# Patient Record
Sex: MALE | Race: WHITE | NOT HISPANIC OR LATINO | Employment: OTHER | URBAN - METROPOLITAN AREA
[De-identification: names, ages, dates, MRNs, and addresses within clinical notes are randomized per-mention and may not be internally consistent; named-entity substitution may affect disease eponyms.]

---

## 2017-01-03 ENCOUNTER — PATIENT MESSAGE (OUTPATIENT)
Dept: UROLOGY | Facility: CLINIC | Age: 77
End: 2017-01-03

## 2017-01-03 ENCOUNTER — OFFICE VISIT (OUTPATIENT)
Dept: UROLOGY | Facility: CLINIC | Age: 77
End: 2017-01-03
Payer: MEDICARE

## 2017-01-03 VITALS
BODY MASS INDEX: 31.55 KG/M2 | DIASTOLIC BLOOD PRESSURE: 91 MMHG | SYSTOLIC BLOOD PRESSURE: 151 MMHG | HEIGHT: 69 IN | HEART RATE: 68 BPM | WEIGHT: 213 LBS

## 2017-01-03 DIAGNOSIS — N31.9 NEUROGENIC BLADDER: Primary | ICD-10-CM

## 2017-01-03 DIAGNOSIS — Z85.46 H/O PROSTATE CANCER: ICD-10-CM

## 2017-01-03 DIAGNOSIS — Z92.3 HISTORY OF BRACHYTHERAPY: ICD-10-CM

## 2017-01-03 LAB — POC RESIDUAL URINE VOLUME: NORMAL ML (ref 0–100)

## 2017-01-03 PROCEDURE — 51798 US URINE CAPACITY MEASURE: CPT | Mod: S$GLB,,, | Performed by: NURSE PRACTITIONER

## 2017-01-03 PROCEDURE — 99211 OFF/OP EST MAY X REQ PHY/QHP: CPT | Mod: 25,S$GLB,, | Performed by: NURSE PRACTITIONER

## 2017-01-03 PROCEDURE — 99999 PR PBB SHADOW E&M-EST. PATIENT-LVL V: CPT | Mod: PBBFAC,,, | Performed by: NURSE PRACTITIONER

## 2017-01-03 NOTE — PATIENT INSTRUCTIONS
Urinary Retention (Male)  Urinary retention is the medical term for difficulty or inability to pass urine, even though your bladder is full.  Causes  The most common cause of urinary retention in men is the bladder outlet being blocked. This can be due to an enlarged prostate gland or a bladder infection. Certain medicines can also cause this problem. This condition is more likely to occur as men get older.    This condition is treated by insertion of a catheter into the bladder to drain the urine. This provides immediate relief. The catheter may need to remain in place for a few days to prevent a recurrence. The catheter has a balloon on the tip which was inflated after insertion. This prevents the catheter from falling out.  Symptoms  Common symptoms of urinary retention include:  · Pain (not experienced by everyone)  · Frequent urination  · Feeling that the bladder is still full after urinating  · Incontinence (not being able to control the release of urine)  · Swollen abdomen  Treatment  This condition is treated by inserting a tube (catheter) into the bladder to drain the urine. This provides immediate relief. The catheter may need to stay in place for a few days. The catheter has a balloon on the tip, which is inflated after insertion. This prevents the catheter from falling out.  Home care  · If you were given antibiotics, take them until they are used up, or your healthcare provider tells you to stop. It is important to finish the antibiotics even though you feel better. This is to make sure your infection has cleared.  · If a catheter was left in place, it is important to keep bacteria from getting into the collection bag. Do not disconnect the catheter from the collection bag.  · Use a leg band to secure the drainage tube, so it does not pull on the catheter. Drain the collection bag when it becomes full using the drain spout at the bottom of the bag.  · Do not pull on or try to remove your catheter.  This will injure your urethra. The catheter must be removed by a healthcare provider.  Follow-up care  Follow up with your healthcare provider, or as advised.  If a catheter was left in place, it can usually be removed within 3 to 7 days. Some conditions require the catheter to stay in longer. Your healthcare provider will tell you when to return to have the catheter removed.  When to seek medical advice  Call your healthcare provider right away if any of these occur:  · Fever of 101.4ºF (38ºC) or higher, or as directed by your healthcare provider  · Bladder or lower-abdominal pain or fullness  · Abdominal swelling, nausea, vomiting, or back pain  · Blood or urine leakage around the catheter  · Bloody urine coming from the catheter (if a new symptom)  · Weakness, dizziness, or fainting  · Confusion or change in usual level of alertness  · If a catheter was left in place, return if:  ¨ Catheter falls out  ¨ Catheter stops draining for 6 hours  © 8271-6165 [employer] [address] [place] [address], [place] [address]. All rights reserved. This information is not intended as a substitute for professional medical care. Always follow your healthcare professional's instructions.

## 2017-01-03 NOTE — PROGRESS NOTES
CHIEF COMPLAINT:    Mr. Elizondo is a 76 y.o. male presenting for PVR.    PRESENTING ILLNESS:    Raymond Elizondo is a 76 y.o. male with a PMH of prostate cancer who presents for PVR.  He was last seen with Dr. Hemphill on 11/4/16 for a post op visit.     s/p cysto and urethral dilation for meatal stenosis, membranous urethral stricture, and CO2 laser and excisional bx of multiple penile lesions on 11/2/16 with Dr. Hemphill. On 11/4/16, leyva catheter was pulled but pt returned to the ED for urinary retention. Leyva reinserted at ED.   Prior to procedures, he had c/p hesitancy, difficult voiding, dribbling.     s/p brachytherapy combined with external beam radiation therapy for prostate cancer at  10 years ago.  Hx of urinary retention with blood clots.     Today, he is here for PVR following VT.   He reports a good FOS and complete bladder emptying.   He has no urinary complaints.   He denies dysuria, flank pain, and f/c.   Currently taking flomax daily.    REVIEW OF SYSTEMS:    Review of Systems    Constitutional: Negative for fever and chills.   HENT: Negative for hearing loss.   Eyes: Negative for visual disturbance.   Respiratory: Negative for shortness of breath.   Cardiovascular: Negative for chest pain.   Gastrointestinal: Negative for nausea, vomiting, and constipation.   Genitourinary:  See above  Neurological: Negative for dizziness.   Hematological: Does not bruise/bleed easily.   Psychiatric/Behavioral: Negative for confusion.       PATIENT HISTORY:    Past Medical History   Diagnosis Date    Cirrhosis     COPD (chronic obstructive pulmonary disease)     Coronary artery disease     Diabetes mellitus     Esophageal cancer     Hypertension     Irregular heart beat     Pancreatitis      3x    Prostate cancer     Skin cancer      multiple sites    Urinary tract infection        Past Surgical History   Procedure Laterality Date    Skin cancer excision       multiple    Appendectomy       Cholecystectomy      Esophageal reconstruction      Elbow surgery       Right    Knee surgery       right    Tonsillectomy      Skin cancer excision       Left ear, Left flank, Right flank       Family History   Problem Relation Age of Onset    Diabetes Mother     Cancer Mother     Cancer Father     Prostate cancer Neg Hx     Kidney disease Neg Hx        Social History     Social History    Marital status:      Spouse name: N/A    Number of children: N/A    Years of education: N/A     Occupational History    Not on file.     Social History Main Topics    Smoking status: Current Some Day Smoker     Packs/day: 0.25     Years: 0.50    Smokeless tobacco: Not on file    Alcohol use No    Drug use: No    Sexual activity: Not Currently     Other Topics Concern    Not on file     Social History Narrative       Allergies:  Aspirin    Medications:    Current Outpatient Prescriptions:     albuterol (PROVENTIL) 5 mg/mL nebulizer solution, Take 2.5 mg by nebulization every 6 (six) hours as needed., Disp: , Rfl:     alprazolam (XANAX) 0.25 MG tablet, Take 0.25 mg by mouth 3 (three) times daily., Disp: , Rfl:     amlodipine (NORVASC) 10 MG tablet, Take 10 mg by mouth once daily., Disp: , Rfl:     atenolol (TENORMIN) 25 MG tablet, Take 25 mg by mouth once daily., Disp: , Rfl:     betamethasone dipropionate (DIPROLENE) 0.05 % ointment, Apply topically 2 (two) times daily. To start at 1 week post-surgery., Disp: 45 g, Rfl: 0    BREO ELLIPTA 100-25 mcg/dose diskus inhaler, , Disp: , Rfl:     bumetanide (BUMEX) 2 MG tablet, Take 2 mg by mouth 2 (two) times daily., Disp: , Rfl:     clotrimazole-betamethasone (LOTRISONE) lotion, APPLY 1 APPLICATION TOPICALLY TO AFFECTED AREA 2 TIMES PER DAY, Disp: , Rfl: 2    diphenoxylate-atropine 2.5-0.025 mg (LOMOTIL) 2.5-0.025 mg per tablet, Take 2 tablets by mouth every 6 (six) hours as needed., Disp: , Rfl: 5    fenofibrate 160 MG Tab, Take 160 mg by mouth once  daily., Disp: , Rfl:     ferrous sulfate 325 mg (65 mg iron) Tab tablet, Take 325 mg by mouth once daily., Disp: , Rfl:     glipiZIDE (GLUCOTROL) 5 MG tablet, Take 5 mg by mouth daily with breakfast. , Disp: , Rfl:     ipratropium (ATROVENT HFA) 17 mcg/actuation inhaler, Inhale 2 puffs into the lungs every 6 (six) hours., Disp: , Rfl:     lorazepam (ATIVAN) 1 MG tablet, Take 1 mg by mouth every 6 (six) hours as needed for Anxiety., Disp: , Rfl:     nitroGLYCERIN (NITROSTAT) 0.4 MG SL tablet, Place 0.4 mg under the tongue every 5 (five) minutes as needed for Chest pain., Disp: , Rfl:     omeprazole (PRILOSEC) 20 MG capsule, Take 20 mg by mouth once daily., Disp: , Rfl:     polyethylene glycol (GLYCOLAX) 17 gram PwPk, Take 17 g by mouth daily as needed., Disp: 30 packet, Rfl: 0    potassium chloride (MICRO-K) 10 MEQ CpSR, Take 10 mEq by mouth once daily., Disp: , Rfl:     pravastatin (PRAVACHOL) 40 MG tablet, Take 40 mg by mouth once daily., Disp: , Rfl:     senna (SENOKOT) 8.6 mg tablet, Take 1 tablet by mouth once daily., Disp: , Rfl:     tamsulosin (FLOMAX) 0.4 mg Cp24, Take 1 capsule (0.4 mg total) by mouth every evening., Disp: 90 capsule, Rfl: 3    tramadol (ULTRAM) 50 mg tablet, Take 50 mg by mouth every 6 (six) hours as needed for Pain., Disp: , Rfl:     VENTOLIN HFA 90 mcg/actuation inhaler, , Disp: , Rfl:     PHYSICAL EXAMINATION:    Constitutional: He is oriented to person, place, and time. He appears well-developed and well-nourished.  He is in no apparent distress.    Neck: No tracheal deviation present.     Cardiovascular: Normal rate.      Pulmonary/Chest: Effort normal. No respiratory distress.     Abdominal:  He exhibits no distension.     Neurological: He is alert and oriented to person, place, and time.     Skin: Skin is warm and dry.     Psych: Cooperative with normal affect.      Physical Exam      LABS:    Lab Results   Component Value Date    PSADIAG <0.01 06/27/2016        IMPRESSION:    Encounter Diagnoses   Name Primary?    Neurogenic bladder Yes    H/O prostate cancer     History of brachytherapy        PLAN:  Pt urinated 500 cc with no difficulties. PVR with bladder scan was 278 cc. Pt voided again then PVR 96 cc.  Continue flomax  F/u with Dr. Hemphill in 3/2017 per his notes.  Patient voiced understanding  I encouraged him or any of his family members to call or email me with questions and/or concerns.    GLENDY Ashley

## 2017-01-03 NOTE — MR AVS SNAPSHOT
[facility] - Urology 4th Floor  [address]  [place] [address]  Phone: [phone]                  Raymond Elizondo   1/3/2017 10:40 AM   Office Visit    Description:  Male : 1940   Provider:  Ibeth Ponce NP   Department:  [facility] 4th Floor           Reason for Visit     Other           Diagnoses this Visit        Comments    Neurogenic bladder    -  Primary     H/O prostate cancer         History of brachytherapy                To Do List           Goals (5 Years of Data)     None      [facility] On [facility]     [facility] On Call Nurse Care Line -  Assistance  Registered nurses in the [facility] On Call Center provide clinical advisement, health education, appointment booking, and other advisory services.  Call for this free service at [phone].             Medications                Verify that the below list of medications is an accurate representation of the medications you are currently taking.  If none reported, the list may be blank. If incorrect, please contact your healthcare provider. Carry this list with you in case of emergency.           Current Medications     albuterol (PROVENTIL) 5 mg/mL nebulizer solution Take 2.5 mg by nebulization every 6 (six) hours as needed.    alprazolam (XANAX) 0.25 MG tablet Take 0.25 mg by mouth 3 (three) times daily.    amlodipine (NORVASC) 10 MG tablet Take 10 mg by mouth once daily.    atenolol (TENORMIN) 25 MG tablet Take 25 mg by mouth once daily.    BREO ELLIPTA 100-25 mcg/dose diskus inhaler     bumetanide (BUMEX) 2 MG tablet Take 2 mg by mouth 2 (two) times daily.    clotrimazole-betamethasone (LOTRISONE) lotion APPLY 1 APPLICATION TOPICALLY TO AFFECTED AREA 2 TIMES PER DAY    diphenoxylate-atropine 2.5-0.025 mg (LOMOTIL) 2.5-0.025 mg per tablet Take 2 tablets by mouth every 6 (six) hours as needed.    fenofibrate 160 MG Tab Take 160 mg by mouth once daily.    ferrous sulfate 325 mg (65 mg iron) Tab tablet Take 325 mg by  "mouth once daily.    glipiZIDE (GLUCOTROL) 5 MG tablet Take 5 mg by mouth daily with breakfast.     ipratropium (ATROVENT HFA) 17 mcg/actuation inhaler Inhale 2 puffs into the lungs every 6 (six) hours.    lorazepam (ATIVAN) 1 MG tablet Take 1 mg by mouth every 6 (six) hours as needed for Anxiety.    nitroGLYCERIN (NITROSTAT) 0.4 MG SL tablet Place 0.4 mg under the tongue every 5 (five) minutes as needed for Chest pain.    omeprazole (PRILOSEC) 20 MG capsule Take 20 mg by mouth once daily.    polyethylene glycol (GLYCOLAX) 17 gram PwPk Take 17 g by mouth daily as needed.    potassium chloride (MICRO-K) 10 MEQ CpSR Take 10 mEq by mouth once daily.    pravastatin (PRAVACHOL) 40 MG tablet Take 40 mg by mouth once daily.    senna (SENOKOT) 8.6 mg tablet Take 1 tablet by mouth once daily.    tamsulosin (FLOMAX) 0.4 mg Cp24 Take 1 capsule (0.4 mg total) by mouth every evening.    tramadol (ULTRAM) 50 mg tablet Take 50 mg by mouth every 6 (six) hours as needed for Pain.    VENTOLIN HFA 90 mcg/actuation inhaler     betamethasone dipropionate (DIPROLENE) 0.05 % ointment Apply topically 2 (two) times daily. To start at 1 week post-surgery.           Clinical Reference Information           Vital Signs - Last Recorded  Most recent update: 1/3/2017 10:53 AM by Mark Gross LPN    BP Pulse Ht Wt BMI    (!) 151/91 68 5' 9" (1.753 m) 96.6 kg (213 lb) 31.45 kg/m2      Blood Pressure          Most Recent Value    BP  (!)  151/91      Allergies as of 1/3/2017     Aspirin    Ciprofloxacin      Immunizations Administered on Date of Encounter - 1/3/2017     None      Orders Placed During Today's Visit      Normal Orders This Visit    POCT Bladder Scan       [facility] Sign-Up     Activating your [facility] account is as easy as 1-2-3!     1) Visit [email or web address], select Sign Up Now, enter this activation code and your date of birth, then select Next.  [email or web address]  Expires: 2/17/2017 10:56 AM      2) Create a username and " password to use when you visit [facility] in the future and select a security question in case you lose your password and select Next.    3) Enter your e-mail address and click Sign Up!    Additional Information  If you have questions, please e-mail [email or web address] or call [phone] to talk to our [facility] staff. Remember, [facility] is NOT to be used for urgent needs. For medical emergencies, dial 911.         Instructions      Urinary Retention (Male)  Urinary retention is the medical term for difficulty or inability to pass urine, even though your bladder is full.  Causes  The most common cause of urinary retention in men is the bladder outlet being blocked. This can be due to an enlarged prostate gland or a bladder infection. Certain medicines can also cause this problem. This condition is more likely to occur as men get older.    This condition is treated by insertion of a catheter into the bladder to drain the urine. This provides immediate relief. The catheter may need to remain in place for a few days to prevent a recurrence. The catheter has a balloon on the tip which was inflated after insertion. This prevents the catheter from falling out.  Symptoms  Common symptoms of urinary retention include:  · Pain (not experienced by everyone)  · Frequent urination  · Feeling that the bladder is still full after urinating  · Incontinence (not being able to control the release of urine)  · Swollen abdomen  Treatment  This condition is treated by inserting a tube (catheter) into the bladder to drain the urine. This provides immediate relief. The catheter may need to stay in place for a few days. The catheter has a balloon on the tip, which is inflated after insertion. This prevents the catheter from falling out.  Home care  · If you were given antibiotics, take them until they are used up, or your healthcare provider tells you to stop. It is important to finish the antibiotics even though you feel better. This  is to make sure your infection has cleared.  · If a catheter was left in place, it is important to keep bacteria from getting into the collection bag. Do not disconnect the catheter from the collection bag.  · Use a leg band to secure the drainage tube, so it does not pull on the catheter. Drain the collection bag when it becomes full using the drain spout at the bottom of the bag.  · Do not pull on or try to remove your catheter. This will injure your urethra. The catheter must be removed by a healthcare provider.  Follow-up care  Follow up with your healthcare provider, or as advised.  If a catheter was left in place, it can usually be removed within 3 to 7 days. Some conditions require the catheter to stay in longer. Your healthcare provider will tell you when to return to have the catheter removed.  When to seek medical advice  Call your healthcare provider right away if any of these occur:  · Fever of 101.4ºF (38ºC) or higher, or as directed by your healthcare provider  · Bladder or lower-abdominal pain or fullness  · Abdominal swelling, nausea, vomiting, or back pain  · Blood or urine leakage around the catheter  · Bloody urine coming from the catheter (if a new symptom)  · Weakness, dizziness, or fainting  · Confusion or change in usual level of alertness  · If a catheter was left in place, return if:  ¨ Catheter falls out  ¨ Catheter stops draining for 6 hours  © 8159-0181 [employer]. [address] [place] [address]. All rights reserved. This information is not intended as a substitute for professional medical care. Always follow your healthcare professional's instructions.             Smoking Cessation     If you would like to quit smoking:   You may be eligible for free services if you are a [place] resident and started smoking cigarettes before September 1, 1988.  Call the [employer]oking [employer]essation [employer] ([employer]) toll free at (893) 418-8318 or (693) 224-0428.   Call 8-800-QUIT-NOW if you  do not meet the above criteria.

## 2017-01-27 ENCOUNTER — HOSPITAL ENCOUNTER (OUTPATIENT)
Dept: PREADMISSION TESTING | Facility: HOSPITAL | Age: 77
Discharge: HOME | End: 2017-01-27
Attending: ORTHOPAEDIC SURGERY
Payer: MEDICARE

## 2017-01-27 ENCOUNTER — CLINICAL SUPPORT (OUTPATIENT)
Dept: LAB | Facility: HOSPITAL | Age: 77
End: 2017-01-27
Attending: ORTHOPAEDIC SURGERY
Payer: MEDICARE

## 2017-01-27 VITALS
HEART RATE: 53 BPM | OXYGEN SATURATION: 95 % | DIASTOLIC BLOOD PRESSURE: 65 MMHG | RESPIRATION RATE: 16 BRPM | SYSTOLIC BLOOD PRESSURE: 148 MMHG

## 2017-01-27 DIAGNOSIS — Z01.818 PRE-OP TESTING: Primary | ICD-10-CM

## 2017-01-27 DIAGNOSIS — Z01.818 PREOP EXAMINATION: ICD-10-CM

## 2017-01-27 DIAGNOSIS — M70.22 OLECRANON BURSITIS OF LEFT ELBOW: ICD-10-CM

## 2017-01-27 PROCEDURE — 93005 ELECTROCARDIOGRAM TRACING: CPT

## 2017-01-27 RX ORDER — CEFAZOLIN SODIUM 2 G/50ML
2 SOLUTION INTRAVENOUS
Status: CANCELLED | OUTPATIENT
Start: 2017-02-02

## 2017-01-27 RX ORDER — LIDOCAINE HYDROCHLORIDE 10 MG/ML
1 INJECTION, SOLUTION EPIDURAL; INFILTRATION; INTRACAUDAL; PERINEURAL ONCE
Status: CANCELLED | OUTPATIENT
Start: 2017-01-27 | End: 2017-01-27

## 2017-01-27 RX ORDER — SODIUM CHLORIDE, SODIUM LACTATE, POTASSIUM CHLORIDE, CALCIUM CHLORIDE 600; 310; 30; 20 MG/100ML; MG/100ML; MG/100ML; MG/100ML
INJECTION, SOLUTION INTRAVENOUS CONTINUOUS
Status: CANCELLED | OUTPATIENT
Start: 2017-01-27

## 2017-01-27 NOTE — DISCHARGE INSTRUCTIONS
Your surgery is scheduled for 2/2/17.    Please report to Outpatient Surgery Intake Office on the 2nd FLOOR at 6:15 a.m.          INSTRUCTIONS IMPORTANT!!!  ¨ Do not eat or drink after 12 midnight-including water. OK to brush teeth, no   gum, candy or mints!    ¨ Take only these medicines with a small swallow of water-morning of surgery: Amlodipine, atenolol, tamsulosin, nebulizer and Breo        ____  Proceed to [facility] on 1/27/17 for additional blood test.        ____  No powder, lotions or creams to surgical area.  ____  Please remove all jewelry, including piercings and leave at home.  ____  No money or valuables needed. Please leave at home.  ____  Please bring any documents given by your doctor.  ____  If going home the same day, arrange for a ride home. You will not be able to             drive if Anesthesia was used.  ____  Wear loose fitting clothing. Allow for dressings, bandages.  ____  Stop Aspirin, Ibuprofen, Motrin and Aleve at least 3-5 days before surgery, unless otherwise instructed by your doctor, or the nurse.   You MAY use Tylenol/acetaminophen until day of surgery.  ____  Wash the surgical area with Hibiclens the night before surgery, and again the             morning of surgery.  Be sure to rinse hibiclens off completely (if instructed by nurse).  ____  If you take diabetic medication, do not take am of surgery unless instructed by Doctor.  ____  Call MD for temperature above 101 degrees.  ____ Stop taking any Fish Oil supplement or any Vitamins that contain Vitamin E at least 5 days prior to surgery.  ____ Do Not wear your contact lenses the day of your procedure.  You may wear your glasses.        I have read or had read and explained to me, and understand the above information.  Additional comments or instructions:  For additional questions call [phone]     Take a Hibiclens shower twice a day for 3 days prior to surgery, including the morning of surgery.   Gargle with  Listerine twice a day for 2 days prior to surgery, including the morning of surgery.      Pre-Op Bathing Instructions    Before surgery, you can play an important role in your own health.    Because skin is not sterile, we need to be sure that your skin is as free of germs as possible. By following the instructions below, you can reduce the number of germs on your skin before surgery.    IMPORTANT: You will need to shower with a special soap called Hibiclens*, available at any pharmacy.  If you are allergic to Chlorhexidine (the antiseptic in Hibiclens), use an antibacterial soap such as Dial Soap for your preoperative shower.  You will shower with Hibiclens both the night before your surgery and the morning of your surgery.  Do not use Hibiclens on the head, face or genitals to avoid injury to those areas.    STEP #1: THE NIGHT BEFORE YOUR SURGERY     1. Do not shave the area of your body where your surgery will be performed.  2. Shower and wash your hair and body as usual with your normal soap and shampoo.  3. Rinse your hair and body thoroughly after you shower to remove all soap residue.  4. With your hand, apply one packet of Hibiclens soap to the surgical site.   5. Wash the site gently for five (5) minutes. Do not scrub your skin too hard.   6. Do not wash with your regular soap after Hibiclens is used.  7. Rinse your body thoroughly.  8. Pat yourself dry with a clean, soft towel.  9. Do not use lotion, cream, or powder.  10. Wear clean clothes.    STEP #2: THE MORNING OF YOUR SURGERY     1. Repeat Step #1.    * Not to be used by people allergic to Chlorhexidine.          Anesthesia: General Anesthesia  Youre due to have surgery. During surgery, youll be given medication called anesthesia. (It is also called anesthetic.) This will keep you comfortable and pain-free. Your anesthesia provider will use general anesthesia. This sheet tells you more about it.  What is general anesthesia?     You are watched  continuously during your procedure by the anesthesia provider   General anesthesia puts you into a state like deep sleep. It goes into the bloodstream (IV anesthetics), into the lungs (gas anesthetics), or both. You feel nothing during the procedure. You will not remember it. During the procedure, the anesthesia provider monitors you continuously. He or she checks your heart rate and rhythm, blood pressure, breathing, and blood oxygen.  · IV Anesthetics. IV anesthetics are given through an IV line in your arm. Theyre often given first. This is so you are asleep before a gas anesthetic is started. Some kinds of IV anesthetics relieve pain. Others relax you. Your doctor will decide which kind is best in your case.  · Gas Anesthetics. Gas anesthetics are breathed into the lungs. They are often used to keep you asleep. They can be given through a facemask or a tube placed in your larynx or trachea (breathing tube).  ¨ If you have a facemask, your anesthesia provider will most likely place it over your nose and mouth while youre still awake. Youll breathe oxygen through the mask as your IV anesthetic is started. Gas anesthetic may be added through the mask.  ¨ If you have a tube in the larynx or trachea, it will be inserted into your throat after youre asleep.  Anesthesia tools and medications  You will likely have:  · IV anesthetics. These are put into an IV line into your bloodstream.  · Gas anesthetics. You breathe these anesthetics into your lungs, where they pass into your bloodstream.  · Pulse oximeter. This is a small clip that is attached to the end of your finger. This measures your blood oxygen level.  · Electrocardiography leads (electrodes). These are small sticky pads that are placed on your chest. They record your heart rate and rhythm.  · Blood pressure cuff. This reads your blood pressure.  Risks and possible complications  General anesthesia has some risks. These include:  · Breathing  problems  · Nausea and vomiting  · Sore throat or hoarseness (usually temporary)  · Allergic reaction to the anesthetic  · Irregular heartbeat (rare)  · Cardiac arrest (rare)   Anesthesia safety  · Follow all instructions you are given for how long not to eat or drink before your procedure.  · Be sure your doctor knows what medications and drugs you take. This includes over-the-counter medications, herbs, supplements, alcohol or other drugs. You will be asked when those were last taken.  · Have an adult family member or friend drive you home after the procedure.  · For the first 24 hours after your surgery:  ¨ Do not drive or use heavy equipment.  ¨ Have a trusted family member or spouse make important decisions or sign documents.  ¨ Avoid alcohol.  ¨ Have a responsible adult stay with you. He or she can watch for problems and help keep you safe.  © 9333-4294 [employer]. [address] [place] [address]. All rights reserved. This information is not intended as a substitute for professional medical care. Always follow your healthcare professional's instructions.

## 2017-01-27 NOTE — IP AVS SNAPSHOT
[facility]  [address]  [place] [address]  Phone: [phone]           Patient Discharge Instructions    Our goal is to set you up for success. This packet includes information on your condition, medications, and your home care. It will help you to care for yourself so you don't get sicker.     Please ask your nurse if you have any questions.        There are many details to remember when preparing for your surgery. Here is what you will need to do, please ask your nurse if there are more specific instructions and if you have any questions:    1. 24 hours before procedure Do not smoke or drink alcoholic beverages 24 hours prior to your procedure    2. Eating before procedure Do not eat or drink anything 8 hours before your procedure - this includes gum, mints, and candy.     3. Day of procedure Please remove all jewelry for the procedure. If you wear contact lenses, dentures, hearing aids or glasses, bring a container to put them in during your surgery and give to a family member for safekeeping.  If your doctor has scheduled you for an overnight stay, bring a small overnight bag with any personal items that you need.    4. After procedure Make arrangements in advance for transportation home by a responsible adult. It is not safe to drive a vehicle during the 24 hours following surgery.     PLEASE NOTE: You may be contacted the day before your surgery to confirm your surgery date and arrival time. The Surgery schedule has many variables which may affect the time of your surgery case. Family members should be available if your surgery time changes.                ** Verify the list of medication(s) below is accurate and up to date. Carry this with you in case of emergency. If your medications have changed, please notify your healthcare provider.             Medication List      TAKE these medications        Additional Info                      * albuterol 5 mg/mL nebulizer solution    Commonly known as:  PROVENTIL   Refills:  0   Dose:  2.5 mg    Instructions:  Take 2.5 mg by nebulization every 6 (six) hours as needed.     Begin Date    AM    Noon    PM    Bedtime       * VENTOLIN HFA 90 mcg/actuation inhaler   Refills:  0   Generic drug:  albuterol      Begin Date    AM    Noon    PM    Bedtime       alprazolam 0.25 MG tablet   Commonly known as:  XANAX   Refills:  0   Dose:  0.25 mg    Instructions:  Take 0.25 mg by mouth 3 (three) times daily.     Begin Date    AM    Noon    PM    Bedtime       amlodipine 10 MG tablet   Commonly known as:  NORVASC   Refills:  0   Dose:  10 mg    Instructions:  Take 10 mg by mouth once daily.     Begin Date    AM    Noon    PM    Bedtime       atenolol 25 MG tablet   Commonly known as:  TENORMIN   Refills:  0   Dose:  25 mg    Instructions:  Take 25 mg by mouth once daily.     Begin Date    AM    Noon    PM    Bedtime       betamethasone dipropionate 0.05 % ointment   Commonly known as:  DIPROLENE   Quantity:  45 g   Refills:  0    Instructions:  Apply topically 2 (two) times daily. To start at 1 week post-surgery.     Begin Date    AM    Noon    PM    Bedtime       BREO ELLIPTA 100-25 mcg/dose diskus inhaler   Refills:  0   Generic drug:  fluticasone-vilanterol      Begin Date    AM    Noon    PM    Bedtime       bumetanide 2 MG tablet   Commonly known as:  BUMEX   Refills:  0   Dose:  2 mg   Indications:  Edema    Instructions:  Take 2 mg by mouth 2 (two) times daily.     Begin Date    AM    Noon    PM    Bedtime       clotrimazole-betamethasone lotion   Commonly known as:  LOTRISONE   Refills:  2    Instructions:  APPLY 1 APPLICATION TOPICALLY TO AFFECTED AREA 2 TIMES PER DAY     Begin Date    AM    Noon    PM    Bedtime       diphenoxylate-atropine 2.5-0.025 mg 2.5-0.025 mg per tablet   Commonly known as:  LOMOTIL   Refills:  5   Dose:  2 tablet    Instructions:  Take 2 tablets by mouth every 6 (six) hours as needed.     Begin Date    AM    Noon    PM     Bedtime       fenofibrate 160 MG Tab   Refills:  0   Dose:  160 mg    Instructions:  Take 160 mg by mouth once daily.     Begin Date    AM    Noon    PM    Bedtime       glipiZIDE 5 MG tablet   Commonly known as:  GLUCOTROL   Refills:  0   Dose:  5 mg    Instructions:  Take 5 mg by mouth daily with breakfast.     Begin Date    AM    Noon    PM    Bedtime       ipratropium 17 mcg/actuation inhaler   Commonly known as:  ATROVENT HFA   Refills:  0   Dose:  2 puff    Instructions:  Inhale 2 puffs into the lungs every 6 (six) hours.     Begin Date    AM    Noon    PM    Bedtime       lorazepam 1 MG tablet   Commonly known as:  ATIVAN   Refills:  0   Dose:  1 mg    Instructions:  Take 1 mg by mouth every 6 (six) hours as needed for Anxiety.     Begin Date    AM    Noon    PM    Bedtime       nitroGLYCERIN 0.4 MG SL tablet   Commonly known as:  NITROSTAT   Refills:  0   Dose:  0.4 mg    Instructions:  Place 0.4 mg under the tongue every 5 (five) minutes as needed for Chest pain.     Begin Date    AM    Noon    PM    Bedtime       omeprazole 20 MG capsule   Commonly known as:  PRILOSEC   Refills:  0   Dose:  20 mg    Instructions:  Take 20 mg by mouth once daily.     Begin Date    AM    Noon    PM    Bedtime       potassium chloride 10 MEQ Cpsr   Commonly known as:  MICRO-K   Refills:  0   Dose:  10 mEq    Instructions:  Take 10 mEq by mouth once daily.     Begin Date    AM    Noon    PM    Bedtime       pravastatin 40 MG tablet   Commonly known as:  PRAVACHOL   Refills:  0   Dose:  40 mg    Instructions:  Take 40 mg by mouth once daily.     Begin Date    AM    Noon    PM    Bedtime       senna 8.6 mg tablet   Commonly known as:  SENOKOT   Refills:  0   Dose:  1 tablet    Instructions:  Take 1 tablet by mouth once daily.     Begin Date    AM    Noon    PM    Bedtime       tamsulosin 0.4 mg Cp24   Commonly known as:  FLOMAX   Quantity:  90 capsule   Refills:  3   Dose:  0.4 mg   Comments:  **Patient requests 90 days supply**     Instructions:  Take 1 capsule (0.4 mg total) by mouth every evening.     Begin Date    AM    Noon    PM    Bedtime       tramadol 50 mg tablet   Commonly known as:  ULTRAM   Refills:  0   Dose:  50 mg    Instructions:  Take 50 mg by mouth every 6 (six) hours as needed for Pain.     Begin Date    AM    Noon    PM    Bedtime       * Notice:  This list has 2 medication(s) that are the same as other medications prescribed for you. Read the directions carefully, and ask your doctor or other care provider to review them with you.               Please bring to all follow up appointments:    1. A copy of your discharge instructions.  2. All medicines you are currently taking in their original bottles.  3. Identification and insurance card.    Please arrive 15 minutes ahead of scheduled appointment time.    Please call 24 hours in advance if you must reschedule your appointment and/or time.        Your Scheduled Appointments     Mar 03, 2017  9:20 AM CST   Established Patient Visit with MD Lan Quezada [facility] - Urology 4th Floor ([facility] )    [address]  [place] [address] [phone]              Your Future Surgeries/Procedures     Feb 02, 2017   Surgery with Edgar Holland MD   [facility] ([facility])    [address] [place] [id number] [phone]                  Discharge Instructions       Your surgery is scheduled for 2/2/17.    Please report to Outpatient Surgery Intake Office on the 2nd FLOOR at 6:15 a.m.          INSTRUCTIONS IMPORTANT!!!  ¨ Do not eat or drink after 12 midnight-including water. OK to brush teeth, no   gum, candy or mints!    ¨ Take only these medicines with a small swallow of water-morning of surgery: Amlodipine, atenolol, tamsulosin, nebulizer and Breo        ____  Proceed to [facility] on 1/27/17 for additional blood test.        ____  No powder, lotions or creams to surgical area.  ____  Please remove  all jewelry, including piercings and leave at home.  ____  No money or valuables needed. Please leave at home.  ____  Please bring any documents given by your doctor.  ____  If going home the same day, arrange for a ride home. You will not be able to             drive if Anesthesia was used.  ____  Wear loose fitting clothing. Allow for dressings, bandages.  ____  Stop Aspirin, Ibuprofen, Motrin and Aleve at least 3-5 days before surgery, unless otherwise instructed by your doctor, or the nurse.   You MAY use Tylenol/acetaminophen until day of surgery.  ____  Wash the surgical area with Hibiclens the night before surgery, and again the             morning of surgery.  Be sure to rinse hibiclens off completely (if instructed by nurse).  ____  If you take diabetic medication, do not take am of surgery unless instructed by Doctor.  ____  Call MD for temperature above 101 degrees.  ____ Stop taking any Fish Oil supplement or any Vitamins that contain Vitamin E at least 5 days prior to surgery.  ____ Do Not wear your contact lenses the day of your procedure.  You may wear your glasses.        I have read or had read and explained to me, and understand the above information.  Additional comments or instructions:  For additional questions call [phone]     Take a Hibiclens shower twice a day for 3 days prior to surgery, including the morning of surgery.   Gargle with Listerine twice a day for 2 days prior to surgery, including the morning of surgery.      Pre-Op Bathing Instructions    Before surgery, you can play an important role in your own health.    Because skin is not sterile, we need to be sure that your skin is as free of germs as possible. By following the instructions below, you can reduce the number of germs on your skin before surgery.    IMPORTANT: You will need to shower with a special soap called Hibiclens*, available at any pharmacy.  If you are allergic to Chlorhexidine (the antiseptic in Hibiclens), use  an antibacterial soap such as Dial Soap for your preoperative shower.  You will shower with Hibiclens both the night before your surgery and the morning of your surgery.  Do not use Hibiclens on the head, face or genitals to avoid injury to those areas.    STEP #1: THE NIGHT BEFORE YOUR SURGERY     1. Do not shave the area of your body where your surgery will be performed.  2. Shower and wash your hair and body as usual with your normal soap and shampoo.  3. Rinse your hair and body thoroughly after you shower to remove all soap residue.  4. With your hand, apply one packet of Hibiclens soap to the surgical site.   5. Wash the site gently for five (5) minutes. Do not scrub your skin too hard.   6. Do not wash with your regular soap after Hibiclens is used.  7. Rinse your body thoroughly.  8. Pat yourself dry with a clean, soft towel.  9. Do not use lotion, cream, or powder.  10. Wear clean clothes.    STEP #2: THE MORNING OF YOUR SURGERY     1. Repeat Step #1.    * Not to be used by people allergic to Chlorhexidine.          Anesthesia: General Anesthesia  Youre due to have surgery. During surgery, youll be given medication called anesthesia. (It is also called anesthetic.) This will keep you comfortable and pain-free. Your anesthesia provider will use general anesthesia. This sheet tells you more about it.  What is general anesthesia?     You are watched continuously during your procedure by the anesthesia provider   General anesthesia puts you into a state like deep sleep. It goes into the bloodstream (IV anesthetics), into the lungs (gas anesthetics), or both. You feel nothing during the procedure. You will not remember it. During the procedure, the anesthesia provider monitors you continuously. He or she checks your heart rate and rhythm, blood pressure, breathing, and blood oxygen.  · IV Anesthetics. IV anesthetics are given through an IV line in your arm. Theyre often given first. This is so you are asleep  before a gas anesthetic is started. Some kinds of IV anesthetics relieve pain. Others relax you. Your doctor will decide which kind is best in your case.  · Gas Anesthetics. Gas anesthetics are breathed into the lungs. They are often used to keep you asleep. They can be given through a facemask or a tube placed in your larynx or trachea (breathing tube).  ¨ If you have a facemask, your anesthesia provider will most likely place it over your nose and mouth while youre still awake. Youll breathe oxygen through the mask as your IV anesthetic is started. Gas anesthetic may be added through the mask.  ¨ If you have a tube in the larynx or trachea, it will be inserted into your throat after youre asleep.  Anesthesia tools and medications  You will likely have:  · IV anesthetics. These are put into an IV line into your bloodstream.  · Gas anesthetics. You breathe these anesthetics into your lungs, where they pass into your bloodstream.  · Pulse oximeter. This is a small clip that is attached to the end of your finger. This measures your blood oxygen level.  · Electrocardiography leads (electrodes). These are small sticky pads that are placed on your chest. They record your heart rate and rhythm.  · Blood pressure cuff. This reads your blood pressure.  Risks and possible complications  General anesthesia has some risks. These include:  · Breathing problems  · Nausea and vomiting  · Sore throat or hoarseness (usually temporary)  · Allergic reaction to the anesthetic  · Irregular heartbeat (rare)  · Cardiac arrest (rare)   Anesthesia safety  · Follow all instructions you are given for how long not to eat or drink before your procedure.  · Be sure your doctor knows what medications and drugs you take. This includes over-the-counter medications, herbs, supplements, alcohol or other drugs. You will be asked when those were last taken.  · Have an adult family member or friend drive you home after the procedure.  · For the  first 24 hours after your surgery:  ¨ Do not drive or use heavy equipment.  ¨ Have a trusted family member or spouse make important decisions or sign documents.  ¨ Avoid alcohol.  ¨ Have a responsible adult stay with you. He or she can watch for problems and help keep you safe.  © 5638-9000 [employer]. [address] [place] [address]. All rights reserved. This information is not intended as a substitute for professional medical care. Always follow your healthcare professional's instructions.            Admission Information     Date & Time Provider Department CSN    1/27/2017 12:30 PM Edgar Holland MD [facility] [id number]      Care Providers     Provider Role Specialty Primary office phone    Edgar Holland MD Attending Provider Orthopedic Surgery [phone]      Recent Lab Values        2/3/2016                           4:23 AM           A1C 5.2                       Allergies as of 1/27/2017        Reactions    Aspirin Other (See Comments)    Any blood thinners  Severe bleeding    Ciprofloxacin Swelling      [facility]     [facility] Nurse Care Line - 24/7 Assistance  Unless otherwise directed by your provider, please contact [facility], our nurse care line that is available for 24/7 assistance.     Registered nurses in the [facility] provide clinical advisement, health education, appointment booking, and other advisory services.  Call for this free service at [phone].        Advance Directives     An advance directive is a document which, in the event you are no longer able to make decisions for yourself, tells your healthcare team what kind of treatment you do or do not want to receive, or who you would like to make those decisions for you.  If you do not currently have an advance directive, [facility] encourages you to create one.  For more information call:  [phone]), [phone] ([phone]),  or log  on to [email or web address].        Smoking Cessation     If you would like to quit smoking:   You may be eligible for free services if you are a [place] resident and started smoking cigarettes before September 1, 1988.  Call the Smoking Cessation [employer] (SCT) toll free at (390) 549-7986 or (904) 341-8364.   Call 1-800-QUIT-NOW if you do not meet the above criteria.            Language Assistance Services     ATTENTION: Language assistance services are available, free of charge. Please call [phone].      ATENCIÓN: Si habla español, tiene a clark disposición servicios gratuitos de asistencia lingüística. Llame al [phone].     CHÚ Ý: N?u b?n nói Ti?ng Vi?t, có các d?ch v? h? tr? ngôn ng? mi?n phí dành cho b?n. G?i s? [phone].        Diabetes Discharge Instructions                                    [facility] complies with applicable Federal civil rights laws and does not discriminate on the basis of race, color, national origin, age, disability, or sex.

## 2017-02-05 ENCOUNTER — PATIENT MESSAGE (OUTPATIENT)
Dept: UROLOGY | Facility: CLINIC | Age: 77
End: 2017-02-05

## 2017-02-06 DIAGNOSIS — R33.9 INCOMPLETE BLADDER EMPTYING: Primary | ICD-10-CM

## 2017-02-07 ENCOUNTER — LAB VISIT (OUTPATIENT)
Dept: LAB | Facility: HOSPITAL | Age: 77
End: 2017-02-07
Attending: NURSE PRACTITIONER
Payer: MEDICARE

## 2017-02-07 DIAGNOSIS — R33.9 INCOMPLETE BLADDER EMPTYING: ICD-10-CM

## 2017-02-07 PROCEDURE — 87186 SC STD MICRODIL/AGAR DIL: CPT

## 2017-02-07 PROCEDURE — 87086 URINE CULTURE/COLONY COUNT: CPT

## 2017-02-07 PROCEDURE — 87077 CULTURE AEROBIC IDENTIFY: CPT

## 2017-02-07 PROCEDURE — 87088 URINE BACTERIA CULTURE: CPT

## 2017-02-09 ENCOUNTER — PATIENT MESSAGE (OUTPATIENT)
Dept: UROLOGY | Facility: CLINIC | Age: 77
End: 2017-02-09

## 2017-02-09 DIAGNOSIS — N39.0 URINARY TRACT INFECTION WITHOUT HEMATURIA, SITE UNSPECIFIED: Primary | ICD-10-CM

## 2017-02-09 LAB — BACTERIA UR CULT: NORMAL

## 2017-02-09 RX ORDER — CEPHALEXIN 500 MG/1
500 CAPSULE ORAL 2 TIMES DAILY
Qty: 14 CAPSULE | Refills: 0 | Status: SHIPPED | OUTPATIENT
Start: 2017-02-09 | End: 2017-02-16

## 2017-02-10 ENCOUNTER — PATIENT MESSAGE (OUTPATIENT)
Dept: UROLOGY | Facility: CLINIC | Age: 77
End: 2017-02-10

## 2017-02-13 ENCOUNTER — PATIENT MESSAGE (OUTPATIENT)
Dept: UROLOGY | Facility: CLINIC | Age: 77
End: 2017-02-13

## 2017-02-16 ENCOUNTER — OFFICE VISIT (OUTPATIENT)
Dept: UROLOGY | Facility: CLINIC | Age: 77
End: 2017-02-16
Payer: MEDICARE

## 2017-02-16 VITALS
HEART RATE: 78 BPM | BODY MASS INDEX: 31.37 KG/M2 | DIASTOLIC BLOOD PRESSURE: 85 MMHG | HEIGHT: 68 IN | SYSTOLIC BLOOD PRESSURE: 166 MMHG | WEIGHT: 207 LBS | TEMPERATURE: 98 F

## 2017-02-16 DIAGNOSIS — N39.0 URINARY TRACT INFECTION WITHOUT HEMATURIA, SITE UNSPECIFIED: Primary | ICD-10-CM

## 2017-02-16 DIAGNOSIS — R30.0 DYSURIA: ICD-10-CM

## 2017-02-16 PROCEDURE — 99213 OFFICE O/P EST LOW 20 MIN: CPT | Mod: S$GLB,,, | Performed by: NURSE PRACTITIONER

## 2017-02-16 PROCEDURE — 1160F RVW MEDS BY RX/DR IN RCRD: CPT | Mod: S$GLB,,, | Performed by: NURSE PRACTITIONER

## 2017-02-16 PROCEDURE — 99999 PR PBB SHADOW E&M-EST. PATIENT-LVL IV: CPT | Mod: PBBFAC,,, | Performed by: NURSE PRACTITIONER

## 2017-02-16 PROCEDURE — 1159F MED LIST DOCD IN RCRD: CPT | Mod: S$GLB,,, | Performed by: NURSE PRACTITIONER

## 2017-02-16 PROCEDURE — 87086 URINE CULTURE/COLONY COUNT: CPT

## 2017-02-16 PROCEDURE — 1157F ADVNC CARE PLAN IN RCRD: CPT | Mod: S$GLB,,, | Performed by: NURSE PRACTITIONER

## 2017-02-16 RX ORDER — TRAZODONE HYDROCHLORIDE 150 MG/1
150 TABLET ORAL NIGHTLY
Refills: 5 | COMMUNITY
Start: 2017-01-31

## 2017-02-18 LAB — BACTERIA UR CULT: NO GROWTH

## 2017-02-20 ENCOUNTER — TELEPHONE (OUTPATIENT)
Dept: UROLOGY | Facility: CLINIC | Age: 77
End: 2017-02-20

## 2017-02-20 DIAGNOSIS — R30.0 DYSURIA: Primary | ICD-10-CM

## 2017-02-20 NOTE — TELEPHONE ENCOUNTER
Called to notify him of need for cysto and maybe dilation. Explained that Dr. Hemphill could do cysto w/o anesthesia or do cysto w/ dilation w/ anesthesia. He preferred w/ anesthesia. Explained I would talk to Dr. Hemphill and he would be notified with schedule. Verbalized understanding.

## 2017-02-21 ENCOUNTER — PATIENT MESSAGE (OUTPATIENT)
Dept: UROLOGY | Facility: CLINIC | Age: 77
End: 2017-02-21

## 2017-02-22 NOTE — PROGRESS NOTES
CHIEF COMPLAINT:    Mr. Elizondo is a 77 y.o. male presenting for f/u UTI.    PRESENTING ILLNESS:    Raymond Elizondo is a 77 y.o. male with a PMH of prostate cancer who presents for f/u UTI.  He was last seen with Dr. Hemphill on 11/4/16 for a post op visit.     s/p cysto and urethral dilation for meatal stenosis, membranous urethral stricture, and CO2 laser and excisional bx of multiple penile lesions on 11/2/16 with Dr. Hemphill. On 11/4/16, leyva catheter was pulled but pt returned to the ED for urinary retention. Leyva reinserted at ED.   Prior to procedures, he had c/o hesitancy, difficult voiding, and dribbling.     s/p brachytherapy combined with external beam radiation therapy for prostate cancer at  10 years ago.  Hx of urinary retention with blood clots.     Today, he is here to f/u after a +UC- ecoli.    Today he reports weak FOS, dysuria, frequency, and PVD that started 4 days before he had his urine cultured. He states his symptoms have improved but continues to have dysuria.  He completed Keflex for 7 days.  He denies abdominal pain, flank pain, and f/c.   Currently taking flomax daily.    REVIEW OF SYSTEMS:    Review of Systems    Constitutional: Negative for fever and chills.   HENT: Negative for hearing loss.   Eyes: Negative for visual disturbance.   Respiratory: Negative for shortness of breath.   Cardiovascular: Negative for chest pain.   Gastrointestinal: Negative for nausea, vomiting, and constipation.   Genitourinary:  See above  Neurological: Negative for dizziness.   Hematological: Does not bruise/bleed easily.   Psychiatric/Behavioral: Negative for confusion.       PATIENT HISTORY:    Past Medical History   Diagnosis Date    Cirrhosis     COPD (chronic obstructive pulmonary disease)     Coronary artery disease     Diabetes mellitus     Esophageal cancer     Hypertension     Irregular heart beat     Pancreatitis      3x    Prostate cancer     Skin cancer      multiple sites     Urinary tract infection        Past Surgical History   Procedure Laterality Date    Appendectomy      Cholecystectomy  2001    Esophageal reconstruction  2011    Tonsillectomy      Skin cancer excision       Left ear, Left flank, Right flank    Knee arthroscopy w/ meniscal repair Right           Elbow fracture surgery Right           Prostate biopsy  2016       Family History   Problem Relation Age of Onset    Diabetes Mother     Cancer Mother     Cancer Father     Prostate cancer Neg Hx     Kidney disease Neg Hx        Social History     Social History    Marital status:      Spouse name: N/A    Number of children: N/A    Years of education: N/A     Occupational History    Not on file.     Social History Main Topics    Smoking status: Former Smoker     Packs/day: 0.25     Years: 0.50    Smokeless tobacco: Not on file    Alcohol use No    Drug use: No    Sexual activity: Not Currently     Other Topics Concern    Not on file     Social History Narrative       Allergies:  Aspirin and Ciprofloxacin    Medications:    Current Outpatient Prescriptions:     albuterol (PROVENTIL) 5 mg/mL nebulizer solution, Take 2.5 mg by nebulization every 6 (six) hours as needed., Disp: , Rfl:     alprazolam (XANAX) 0.25 MG tablet, Take 0.25 mg by mouth 3 (three) times daily., Disp: , Rfl:     amlodipine (NORVASC) 10 MG tablet, Take 10 mg by mouth once daily., Disp: , Rfl:     atenolol (TENORMIN) 25 MG tablet, Take 25 mg by mouth once daily., Disp: , Rfl:     BREO ELLIPTA 100-25 mcg/dose diskus inhaler, , Disp: , Rfl:     bumetanide (BUMEX) 2 MG tablet, Take 2 mg by mouth 2 (two) times daily., Disp: , Rfl:     clotrimazole-betamethasone (LOTRISONE) lotion, APPLY 1 APPLICATION TOPICALLY TO AFFECTED AREA 2 TIMES PER DAY, Disp: , Rfl: 2    diphenoxylate-atropine 2.5-0.025 mg (LOMOTIL) 2.5-0.025 mg per tablet, Take 2 tablets by mouth every 6 (six) hours as needed., Disp: , Rfl: 5    fenofibrate 160 MG  Tab, Take 160 mg by mouth once daily., Disp: , Rfl:     glipiZIDE (GLUCOTROL) 5 MG tablet, Take 5 mg by mouth daily with breakfast. , Disp: , Rfl:     ipratropium (ATROVENT HFA) 17 mcg/actuation inhaler, Inhale 2 puffs into the lungs every 6 (six) hours., Disp: , Rfl:     lorazepam (ATIVAN) 1 MG tablet, Take 1 mg by mouth every 6 (six) hours as needed for Anxiety., Disp: , Rfl:     nitroGLYCERIN (NITROSTAT) 0.4 MG SL tablet, Place 0.4 mg under the tongue every 5 (five) minutes as needed for Chest pain., Disp: , Rfl:     omeprazole (PRILOSEC) 20 MG capsule, Take 20 mg by mouth once daily., Disp: , Rfl:     potassium chloride (MICRO-K) 10 MEQ CpSR, Take 10 mEq by mouth once daily., Disp: , Rfl:     pravastatin (PRAVACHOL) 40 MG tablet, Take 40 mg by mouth once daily., Disp: , Rfl:     senna (SENOKOT) 8.6 mg tablet, Take 1 tablet by mouth once daily., Disp: , Rfl:     tamsulosin (FLOMAX) 0.4 mg Cp24, Take 1 capsule (0.4 mg total) by mouth every evening., Disp: 90 capsule, Rfl: 3    tramadol (ULTRAM) 50 mg tablet, Take 50 mg by mouth every 6 (six) hours as needed for Pain., Disp: , Rfl:     trazodone (DESYREL) 150 MG tablet, Take 150 mg by mouth every evening., Disp: , Rfl: 5    VENTOLIN HFA 90 mcg/actuation inhaler, , Disp: , Rfl:     betamethasone dipropionate (DIPROLENE) 0.05 % ointment, Apply topically 2 (two) times daily. To start at 1 week post-surgery., Disp: 45 g, Rfl: 0    PHYSICAL EXAMINATION:    Constitutional: He is oriented to person, place, and time. He appears well-developed and well-nourished.  He is in no apparent distress.    Neck: No tracheal deviation present.     Cardiovascular: Normal rate.      Pulmonary/Chest: Effort normal. No respiratory distress.     Abdominal:  He exhibits no distension.     Neurological: He is alert and oriented to person, place, and time.     Skin: Skin is warm and dry.     Psych: Cooperative with normal affect.      Physical Exam      LABS:  UA showed no  infection or blood.   PVR today was 275 cc.  Lab Results   Component Value Date    PSADIAG <0.01 06/27/2016       IMPRESSION:    Encounter Diagnoses   Name Primary?    Urinary tract infection without hematuria, site unspecified Yes    Dysuria        PLAN:  Pyridium for dysuria.   UC sent to the lab.  Cysto with dilation and will discuss with Dr. Hemphill.   Continue flomax  Patient voiced understanding  I encouraged him or any of his family members to call or email me with questions and/or concerns.    GLENDY Ashley

## 2017-02-23 ENCOUNTER — PATIENT MESSAGE (OUTPATIENT)
Dept: UROLOGY | Facility: CLINIC | Age: 77
End: 2017-02-23

## 2017-02-24 ENCOUNTER — TELEPHONE (OUTPATIENT)
Dept: UROLOGY | Facility: CLINIC | Age: 77
End: 2017-02-24

## 2017-02-24 ENCOUNTER — PATIENT MESSAGE (OUTPATIENT)
Dept: UROLOGY | Facility: CLINIC | Age: 77
End: 2017-02-24

## 2017-02-24 DIAGNOSIS — N35.9 URETHRAL STRICTURE, UNSPECIFIED STRICTURE TYPE: Primary | ICD-10-CM

## 2017-02-27 ENCOUNTER — TELEPHONE (OUTPATIENT)
Dept: UROLOGY | Facility: CLINIC | Age: 77
End: 2017-02-27

## 2017-02-27 ENCOUNTER — PATIENT MESSAGE (OUTPATIENT)
Dept: UROLOGY | Facility: CLINIC | Age: 77
End: 2017-02-27

## 2017-02-27 RX ORDER — BUMETANIDE 1 MG/1
1 TABLET ORAL EVERY MORNING
COMMUNITY

## 2017-02-27 NOTE — TELEPHONE ENCOUNTER
Called pt to confirm arrival time of 12:30pm for procedure scheduled for Wednesday, 3/1. Gave pt NPO instructions and gave pt opportunity to ask questions. Pt verbalized understanding. This info was also sent to pt via [facility] per pt request.

## 2017-02-28 NOTE — PRE-PROCEDURE INSTRUCTIONS
Spoke with Patient.  NPO, medication, and pre-op instructions reviewed.  Arrival time 1230.  Instructed that he can eat and drink until 0430, then have clear liquids between 0430 - 0830, then to remain NPO after 0830.  His morning glucose readings was 130.   Denies previous problems with Anesthesia.  Uses his Nebulizer 2-4 times per day. Verbalized understanding of instructions.

## 2017-03-01 ENCOUNTER — SURGERY (OUTPATIENT)
Age: 77
End: 2017-03-01

## 2017-03-01 ENCOUNTER — ANESTHESIA EVENT (OUTPATIENT)
Dept: SURGERY | Facility: HOSPITAL | Age: 77
End: 2017-03-01
Payer: MEDICARE

## 2017-03-01 ENCOUNTER — HOSPITAL ENCOUNTER (OUTPATIENT)
Facility: HOSPITAL | Age: 77
Discharge: HOME | End: 2017-03-01
Attending: UROLOGY | Admitting: UROLOGY
Payer: MEDICARE

## 2017-03-01 ENCOUNTER — ANESTHESIA (OUTPATIENT)
Dept: SURGERY | Facility: HOSPITAL | Age: 77
End: 2017-03-01
Payer: MEDICARE

## 2017-03-01 VITALS
BODY MASS INDEX: 30.81 KG/M2 | DIASTOLIC BLOOD PRESSURE: 64 MMHG | RESPIRATION RATE: 16 BRPM | WEIGHT: 208 LBS | TEMPERATURE: 99 F | HEART RATE: 49 BPM | SYSTOLIC BLOOD PRESSURE: 134 MMHG | OXYGEN SATURATION: 96 % | HEIGHT: 69 IN

## 2017-03-01 DIAGNOSIS — N35.919 URETHRAL STRICTURE: ICD-10-CM

## 2017-03-01 DIAGNOSIS — N35.9 URETHRAL STRICTURE, UNSPECIFIED STRICTURE TYPE: ICD-10-CM

## 2017-03-01 LAB
POCT GLUCOSE: 121 MG/DL (ref 70–110)
POCT GLUCOSE: 137 MG/DL (ref 70–110)

## 2017-03-01 PROCEDURE — 52276 CYSTOSCOPY AND TREATMENT: CPT | Mod: ,,, | Performed by: UROLOGY

## 2017-03-01 PROCEDURE — 36000706: Performed by: UROLOGY

## 2017-03-01 PROCEDURE — 25000003 PHARM REV CODE 250: Performed by: NURSE ANESTHETIST, CERTIFIED REGISTERED

## 2017-03-01 PROCEDURE — 36000707: Performed by: UROLOGY

## 2017-03-01 PROCEDURE — C1769 GUIDE WIRE: HCPCS | Performed by: UROLOGY

## 2017-03-01 PROCEDURE — 71000015 HC POSTOP RECOV 1ST HR: Performed by: UROLOGY

## 2017-03-01 PROCEDURE — D9220A PRA ANESTHESIA: Mod: CRNA,,, | Performed by: NURSE ANESTHETIST, CERTIFIED REGISTERED

## 2017-03-01 PROCEDURE — 63600175 PHARM REV CODE 636 W HCPCS: Performed by: NURSE ANESTHETIST, CERTIFIED REGISTERED

## 2017-03-01 PROCEDURE — 25000003 PHARM REV CODE 250: Performed by: STUDENT IN AN ORGANIZED HEALTH CARE EDUCATION/TRAINING PROGRAM

## 2017-03-01 PROCEDURE — 82962 GLUCOSE BLOOD TEST: CPT | Performed by: UROLOGY

## 2017-03-01 PROCEDURE — 27201423 OPTIME MED/SURG SUP & DEVICES STERILE SUPPLY: Performed by: UROLOGY

## 2017-03-01 PROCEDURE — 63600175 PHARM REV CODE 636 W HCPCS: Performed by: STUDENT IN AN ORGANIZED HEALTH CARE EDUCATION/TRAINING PROGRAM

## 2017-03-01 PROCEDURE — D9220A PRA ANESTHESIA: Mod: ANES,,, | Performed by: ANESTHESIOLOGY

## 2017-03-01 PROCEDURE — 37000008 HC ANESTHESIA 1ST 15 MINUTES: Performed by: UROLOGY

## 2017-03-01 PROCEDURE — 37000009 HC ANESTHESIA EA ADD 15 MINS: Performed by: UROLOGY

## 2017-03-01 PROCEDURE — 71000033 HC RECOVERY, INTIAL HOUR: Performed by: UROLOGY

## 2017-03-01 RX ORDER — SUCCINYLCHOLINE CHLORIDE 20 MG/ML
INJECTION INTRAMUSCULAR; INTRAVENOUS
Status: DISCONTINUED | OUTPATIENT
Start: 2017-03-01 | End: 2017-03-01

## 2017-03-01 RX ORDER — SODIUM CHLORIDE 9 MG/ML
INJECTION, SOLUTION INTRAVENOUS CONTINUOUS
Status: DISCONTINUED | OUTPATIENT
Start: 2017-03-01 | End: 2017-03-01 | Stop reason: HOSPADM

## 2017-03-01 RX ORDER — LIDOCAINE HCL/PF 100 MG/5ML
SYRINGE (ML) INTRAVENOUS
Status: DISCONTINUED | OUTPATIENT
Start: 2017-03-01 | End: 2017-03-01

## 2017-03-01 RX ORDER — HYDROMORPHONE HYDROCHLORIDE 1 MG/ML
0.2 INJECTION, SOLUTION INTRAMUSCULAR; INTRAVENOUS; SUBCUTANEOUS EVERY 5 MIN PRN
Status: DISCONTINUED | OUTPATIENT
Start: 2017-03-01 | End: 2017-03-01 | Stop reason: HOSPADM

## 2017-03-01 RX ORDER — ROCURONIUM BROMIDE 10 MG/ML
INJECTION, SOLUTION INTRAVENOUS
Status: DISCONTINUED | OUTPATIENT
Start: 2017-03-01 | End: 2017-03-01

## 2017-03-01 RX ORDER — FENTANYL CITRATE 50 UG/ML
INJECTION, SOLUTION INTRAMUSCULAR; INTRAVENOUS
Status: DISCONTINUED | OUTPATIENT
Start: 2017-03-01 | End: 2017-03-01

## 2017-03-01 RX ORDER — SODIUM CHLORIDE 9 MG/ML
INJECTION, SOLUTION INTRAVENOUS CONTINUOUS PRN
Status: DISCONTINUED | OUTPATIENT
Start: 2017-03-01 | End: 2017-03-01

## 2017-03-01 RX ORDER — SODIUM CHLORIDE 0.9 % (FLUSH) 0.9 %
3 SYRINGE (ML) INJECTION EVERY 8 HOURS
Status: DISCONTINUED | OUTPATIENT
Start: 2017-03-01 | End: 2017-03-01 | Stop reason: HOSPADM

## 2017-03-01 RX ORDER — HYDROCODONE BITARTRATE AND ACETAMINOPHEN 5; 325 MG/1; MG/1
1 TABLET ORAL EVERY 4 HOURS PRN
Status: DISCONTINUED | OUTPATIENT
Start: 2017-03-01 | End: 2017-03-01 | Stop reason: HOSPADM

## 2017-03-01 RX ORDER — ONDANSETRON 8 MG/1
8 TABLET, ORALLY DISINTEGRATING ORAL EVERY 8 HOURS PRN
Status: DISCONTINUED | OUTPATIENT
Start: 2017-03-01 | End: 2017-03-01 | Stop reason: HOSPADM

## 2017-03-01 RX ORDER — VANCOMYCIN HYDROCHLORIDE 1 G/20ML
INJECTION, POWDER, LYOPHILIZED, FOR SOLUTION INTRAVENOUS
Status: DISCONTINUED
Start: 2017-03-01 | End: 2017-03-01 | Stop reason: HOSPADM

## 2017-03-01 RX ORDER — PROPOFOL 10 MG/ML
VIAL (ML) INTRAVENOUS
Status: DISCONTINUED | OUTPATIENT
Start: 2017-03-01 | End: 2017-03-01

## 2017-03-01 RX ORDER — SODIUM CHLORIDE 0.9 % (FLUSH) 0.9 %
3 SYRINGE (ML) INJECTION
Status: DISCONTINUED | OUTPATIENT
Start: 2017-03-01 | End: 2017-03-01 | Stop reason: HOSPADM

## 2017-03-01 RX ORDER — MIDAZOLAM HYDROCHLORIDE 1 MG/ML
INJECTION INTRAMUSCULAR; INTRAVENOUS
Status: DISCONTINUED | OUTPATIENT
Start: 2017-03-01 | End: 2017-03-01

## 2017-03-01 RX ORDER — POLYETHYLENE GLYCOL 3350 17 G/17G
17 POWDER, FOR SOLUTION ORAL DAILY
Qty: 30 PACKET | Refills: 0 | Status: SHIPPED | OUTPATIENT
Start: 2017-03-01

## 2017-03-01 RX ORDER — AMOXICILLIN AND CLAVULANATE POTASSIUM 500; 125 MG/1; MG/1
1 TABLET, FILM COATED ORAL 2 TIMES DAILY
Qty: 14 TABLET | Refills: 0 | Status: SHIPPED | OUTPATIENT
Start: 2017-03-01 | End: 2017-03-08

## 2017-03-01 RX ORDER — ONDANSETRON 2 MG/ML
INJECTION INTRAMUSCULAR; INTRAVENOUS
Status: DISCONTINUED | OUTPATIENT
Start: 2017-03-01 | End: 2017-03-01

## 2017-03-01 RX ORDER — OXYCODONE AND ACETAMINOPHEN 5; 325 MG/1; MG/1
1 TABLET ORAL EVERY 4 HOURS PRN
Qty: 21 TABLET | Refills: 0 | Status: ON HOLD | OUTPATIENT
Start: 2017-03-01 | End: 2017-06-08 | Stop reason: HOSPADM

## 2017-03-01 RX ORDER — DIPHENHYDRAMINE HYDROCHLORIDE 50 MG/ML
25 INJECTION INTRAMUSCULAR; INTRAVENOUS EVERY 6 HOURS PRN
Status: DISCONTINUED | OUTPATIENT
Start: 2017-03-01 | End: 2017-03-01 | Stop reason: HOSPADM

## 2017-03-01 RX ADMIN — ROCURONIUM BROMIDE 10 MG: 10 INJECTION, SOLUTION INTRAVENOUS at 01:03

## 2017-03-01 RX ADMIN — PROPOFOL 120 MG: 10 INJECTION, EMULSION INTRAVENOUS at 01:03

## 2017-03-01 RX ADMIN — ONDANSETRON 4 MG: 2 INJECTION INTRAMUSCULAR; INTRAVENOUS at 01:03

## 2017-03-01 RX ADMIN — Medication 1000 MG: at 01:03

## 2017-03-01 RX ADMIN — GENTAMICIN SULFATE 160 MG: 40 INJECTION, SOLUTION INTRAMUSCULAR; INTRAVENOUS at 01:03

## 2017-03-01 RX ADMIN — FENTANYL CITRATE 100 MCG: 50 INJECTION, SOLUTION INTRAMUSCULAR; INTRAVENOUS at 01:03

## 2017-03-01 RX ADMIN — LIDOCAINE HYDROCHLORIDE 100 MG: 20 INJECTION, SOLUTION INTRAVENOUS at 01:03

## 2017-03-01 RX ADMIN — MIDAZOLAM HYDROCHLORIDE 2 MG: 1 INJECTION, SOLUTION INTRAMUSCULAR; INTRAVENOUS at 01:03

## 2017-03-01 RX ADMIN — SODIUM CHLORIDE: 0.9 INJECTION, SOLUTION INTRAVENOUS at 01:03

## 2017-03-01 RX ADMIN — SUCCINYLCHOLINE CHLORIDE 140 MG: 20 INJECTION, SOLUTION INTRAMUSCULAR; INTRAVENOUS at 01:03

## 2017-03-01 NOTE — DISCHARGE INSTRUCTIONS
Return to ED if catheter stops draining.   Discharge Instructions: Caring for Your Leg Bag  You are going home with a urinary catheter and collection device (drainage bag) in place. One type of collection device is called a leg bag. This is a smaller drainage bag that you can wear on your leg to collect urine during the day. The bag can fit under your clothing. You can move around with greater ease when using a leg bag instead of a larger collection bag.  You were shown how to care for your catheter in the hospital. This sheet will help you remember those steps when you are at home.  Home care  · Wash your hands thoroughly before and after you care for your catheter or collection device.  · Gather your supplies:  ¨ Alcohol wipes  ¨ Soap and water  ¨ Towel and washcloth  ¨ Leg strap and leg bag  · Use soap and water to wash the area where your catheter enters your body. Rinse well.  · Secure the bag stephenson to your leg:  ¨ Position the leg band high on your thigh with the product label pointing away from your leg.  ¨ Stretch the leg band in place and fasten.  ¨ Place the catheter tubing over the bag and secure it. You may secure it with a Velcro tab or other method, depending on the product you use. Be sure to leave enough loop in the catheter above the leg band to avoid pulling on the tube.  ¨ Every 4 to 6 hours, reposition the band to prevent pressure from the elastic on your leg. You can do this by changing the bag to the other leg or by raising or lowering the leg band.  ¨ Wash the band as frequently as needed. You can hand wash and dry the leg band.  · Place the bag in the bag stephenson.  · Clean the urine bag end of the catheter and your catheter port with an alcohol wipe.  · Place a towel under the bag and port to keep urine from dripping onto your leg.  · Before connecting the outlet valve at the bottom of the bag to the catheter, make sure that it is firmly closed. Flip the valve upward toward the bag; it needs  to snap firmly in place. Be sure not to tug on the tubing. Be gentle.  · Attach the urine bag to the end of the catheter; insert the connector snugly into the catheter port. You can avoid dribbling urine by bending the catheter tubing just below the tip and holding it while you disconnect it from the catheter. Be careful to keep the tip clean while connecting the leg bag tubing to the catheter--this keeps germs from getting into the system.  · Drain the bag when it is full. To drain the bag, flip the clamp downward. Direct the flexible outlet tube to control the flow of urine. You dont have to disconnect the leg bag from the catheter to empty it. Raise your leg up to the edge of the toilet to reach the leg bag. Then you can empty the bag directly into the toilet. This way, you wont need to bend over, which may be uncomfortable.  · Keep the leg bag clean. Rinse daily with equal parts water and vinegar to reduce odor and keep the bag free of germs.  · Remember to keep the drainage bag below the level of your bladder for proper drainage.  Follow-up  Make a follow-up appointment as directed by your healthcare provider.  When to call your healthcare provider  Call your healthcare provider immediately if you have any of the following:  · Redness, swelling, or warmth around the catheter entry site  · Pus draining from your catheter entry site or into the catheter tubing and bag  · Blood, clots, or floating debris in the urine  · Nausea and vomiting  · Shaking chills  · Fever above 100.4°F (38°C)  · Pain that is not relieved by medication  · Catheter that falls out or is dislodged   Date Last Reviewed: 9/26/2014  © 6063-2252 [employer]. [address] [place] [address], [place] [address]. All rights reserved. This information is not intended as a substitute for professional medical care. Always follow your healthcare professional's instructions.        Mccord Catheter Care    A Mccord catheter is a rubber tube that is  placed through the urethra (opening where urine comes out) and into the bladder. This helps drain urine from the bladder. There is a small balloon on the end of the tube that is inflated after insertion. This keeps the catheter from sliding out of the bladder.  A Mccord catheter is used to treat urinary retention (unable to pass urine). It is also used when there is incontinence (loss of bladder control).  Home care  · Finish taking any prescribed antibiotic even if you are feeling better before then.  · It is important to keep bacteria from getting into the collection bag. Do not disconnect the catheter from the collection bag.  · Use a leg band to secure the drainage tube, so it does not pull on the catheter. Drain the collection bag when it becomes full using the drain spout at the bottom of the bag.  · Do not try to pull or remove your catheter. This will injure your urethra. It must be removed by your healthcare provider or nurse.  Follow-up care  Follow up with your healthcare provider as advised for repeat urine testing and catheter removal or replacement.  When to seek medical advice  Call your healthcare provider right away if any of these occur:  · Fever of 100.4ºF (38ºC) or higher, or as directed by your healthcare provider  · Bladder pain or fullness  · Abdominal swelling, nausea or vomiting, or back pain  · Blood or urine leakage around the catheter  · Bloody urine coming from the catheter (if a new symptom)  · Catheter falls out  · Catheter stops draining for 6 hours  · Weakness, dizziness, or fainting  Date Last Reviewed: 10/1/2016  © 9530-4355 [employer] [address] [place] [address], [place] [address]. All rights reserved. This information is not intended as a substitute for professional medical care. Always follow your healthcare professional's instructions.

## 2017-03-01 NOTE — IP AVS SNAPSHOT
[facility]  [address]  [facility] [address]  Phone: [phone]           Patient Discharge Instructions     Our goal is to set you up for success. This packet includes information on your condition, medications, and your home care. It will help you to care for yourself so you don't get sicker and need to go back to the hospital.     Please ask your nurse if you have any questions.        There are many details to remember when preparing to leave the hospital. Here is what you will need to do:    1. Take your medicine. If you are prescribed medications, review your Medication List in the following pages. You may have new medications to  at the pharmacy and others that you'll need to stop taking. Review the instructions for how and when to take your medications. Talk with your doctor or nurses if you are unsure of what to do.     2. Go to your follow-up appointments. Specific follow-up information is listed in the following pages. Your may be contacted by a transition nurse or clinical provider about future appointments. Be sure we have all of the phone numbers to reach you, if needed. Please contact your provider's office if you are unable to make an appointment.     3. Watch for warning signs. Your doctor or nurse will give you detailed warning signs to watch for and when to call for assistance. These instructions may also include educational information about your condition. If you experience any of warning signs to your health, call your doctor.               [facility]  Unless otherwise directed by your provider, please contact [facility], our nurse care line that is available for 24/7 assistance.     [phone] (toll-free)    Registered nurses in the [facility] provide clinical advisement, health education, appointment booking, and other advisory services.                    ** Verify the list of medication(s) below is accurate and up  to date. Carry this with you in case of emergency. If your medications have changed, please notify your healthcare provider.             Medication List      START taking these medications        Additional Info                      amoxicillin-clavulanate 500-125mg 500-125 mg Tab   Commonly known as:  AUGMENTIN   Quantity:  14 tablet   Refills:  0   Dose:  1 tablet    Instructions:  Take 1 tablet (500 mg total) by mouth 2 (two) times daily.     Begin Date    AM    Noon    PM    Bedtime       oxycodone-acetaminophen 5-325 mg per tablet   Commonly known as:  PERCOCET   Quantity:  21 tablet   Refills:  0   Dose:  1 tablet    Instructions:  Take 1 tablet by mouth every 4 (four) hours as needed for Pain.     Begin Date    AM    Noon    PM    Bedtime       polyethylene glycol 17 gram Pwpk   Commonly known as:  GLYCOLAX   Quantity:  30 packet   Refills:  0   Dose:  17 g    Instructions:  Take 17 g by mouth once daily.     Begin Date    AM    Noon    PM    Bedtime         CONTINUE taking these medications        Additional Info                      * albuterol 5 mg/mL nebulizer solution   Commonly known as:  PROVENTIL   Refills:  0   Dose:  2.5 mg    Instructions:  Take 2.5 mg by nebulization every 6 (six) hours as needed.     Begin Date    AM    Noon    PM    Bedtime       * VENTOLIN HFA 90 mcg/actuation inhaler   Refills:  0   Dose:  2 puff   Generic drug:  albuterol    Instructions:  Inhale 2 puffs into the lungs every 6 (six) hours as needed.     Begin Date    AM    Noon    PM    Bedtime       alprazolam 0.25 MG tablet   Commonly known as:  XANAX   Refills:  0   Dose:  0.25 mg    Instructions:  Take 0.25 mg by mouth 3 (three) times daily as needed for Insomnia or Anxiety. Takes every morning and prn     Begin Date    AM    Noon    PM    Bedtime       amlodipine 10 MG tablet   Commonly known as:  NORVASC   Refills:  0   Dose:  10 mg    Instructions:  Take 10 mg by mouth every morning.     Begin Date    AM    Noon    PM     Bedtime       atenolol 25 MG tablet   Commonly known as:  TENORMIN   Refills:  0   Dose:  25 mg    Instructions:  Take 25 mg by mouth every morning.     Begin Date    AM    Noon    PM    Bedtime       BREO ELLIPTA 100-25 mcg/dose diskus inhaler   Refills:  0   Dose:  1 puff   Generic drug:  fluticasone-vilanterol    Instructions:  Inhale 1 puff into the lungs every morning.     Begin Date    AM    Noon    PM    Bedtime       bumetanide 1 MG tablet   Commonly known as:  BUMEX   Refills:  0   Dose:  1 mg    Instructions:  Take 1 mg by mouth every morning.     Begin Date    AM    Noon    PM    Bedtime       diphenoxylate-atropine 2.5-0.025 mg 2.5-0.025 mg per tablet   Commonly known as:  LOMOTIL   Refills:  5   Dose:  2 tablet    Instructions:  Take 2 tablets by mouth every 6 (six) hours as needed.     Begin Date    AM    Noon    PM    Bedtime       fenofibrate 160 MG Tab   Refills:  0   Dose:  160 mg    Instructions:  Take 160 mg by mouth every morning.     Begin Date    AM    Noon    PM    Bedtime       glipiZIDE 5 MG tablet   Commonly known as:  GLUCOTROL   Refills:  0   Dose:  2.5 mg    Instructions:  Take 2.5 mg by mouth daily with breakfast. Takes half of a 5 mg tablet every morning     Begin Date    AM    Noon    PM    Bedtime       lorazepam 1 MG tablet   Commonly known as:  ATIVAN   Refills:  0   Dose:  1 mg    Instructions:  Take 1 mg by mouth every 6 (six) hours as needed for Anxiety. Takes two 1 mg tablets every morning and takes 1 mg every bedtime     Begin Date    AM    Noon    PM    Bedtime       nitroGLYCERIN 0.4 MG SL tablet   Commonly known as:  NITROSTAT   Refills:  0   Dose:  0.4 mg    Instructions:  Place 0.4 mg under the tongue every 5 (five) minutes as needed for Chest pain.     Begin Date    AM    Noon    PM    Bedtime       omeprazole 20 MG capsule   Commonly known as:  PRILOSEC   Refills:  0   Dose:  20 mg    Instructions:  Take 20 mg by mouth every morning.     Begin Date    AM    Noon     PM    Bedtime       potassium chloride 10 MEQ Cpsr   Commonly known as:  MICRO-K   Refills:  0   Dose:  10 mEq    Instructions:  Take 10 mEq by mouth every morning.     Begin Date    AM    Noon    PM    Bedtime       pravastatin 40 MG tablet   Commonly known as:  PRAVACHOL   Refills:  0   Dose:  40 mg    Instructions:  Take 40 mg by mouth every morning.     Begin Date    AM    Noon    PM    Bedtime       tamsulosin 0.4 mg Cp24   Commonly known as:  FLOMAX   Quantity:  90 capsule   Refills:  3   Dose:  0.4 mg   Comments:  **Patient requests 90 days supply**    Instructions:  Take 1 capsule (0.4 mg total) by mouth every evening.     Begin Date    AM    Noon    PM    Bedtime       tramadol 50 mg tablet   Commonly known as:  ULTRAM   Refills:  0   Dose:  50 mg    Instructions:  Take 50 mg by mouth every 6 (six) hours as needed for Pain. Takes two 50 mg tablets every morning and takes 50 mg every evening.     Begin Date    AM    Noon    PM    Bedtime       trazodone 150 MG tablet   Commonly known as:  DESYREL   Refills:  5   Dose:  150 mg    Instructions:  Take 150 mg by mouth every evening.     Begin Date    AM    Noon    PM    Bedtime       * Notice:  This list has 2 medication(s) that are the same as other medications prescribed for you. Read the directions carefully, and ask your doctor or other care provider to review them with you.         Where to Get Your Medications      You can get these medications from any pharmacy     Bring a paper prescription for each of these medications     amoxicillin-clavulanate 500-125mg 500-125 mg Tab    oxycodone-acetaminophen 5-325 mg per tablet    polyethylene glycol 17 gram Pwpk                  Please bring to all follow up appointments:    1. A copy of your discharge instructions.  2. All medicines you are currently taking in their original bottles.  3. Identification and insurance card.    Please arrive 15 minutes ahead of scheduled appointment time.    Please call 24 hours in  advance if you must reschedule your appointment and/or time.        Your Scheduled Appointments     Mar 03, 2017  9:20 AM CST   Established Patient Visit with Connor Hemphill MD   [facility] 4th Floor ([address] )    [address]  [facility] [address] [phone]              Follow-up Information     Follow up with Ibeth Ponce NP On 3/6/2017.    Specialty:  Urology    Why:  leyva removal, teach CIC    Contact information:    [address]  [facility] [id number] [phone]          Follow up with Connor Hemphill MD In 3 months.    Specialty:  Urology    Why:  symptom check    Contact information:    [address]  [facility] [address] [phone]          Discharge Instructions     Future Orders    Activity as tolerated     Call MD for:  persistent nausea and vomiting     Call MD for:  severe uncontrolled pain     Call MD for:  temperature >100.4     Diet general     Questions:    Total calories:      Fat restriction, if any:      Protein restriction, if any:      Na restriction, if any:      Fluid restriction:      Additional restrictions:      No dressing needed         Discharge Instructions       Return to ED if catheter stops draining.   Discharge Instructions: Caring for Your Leg Bag  You are going home with a urinary catheter and collection device (drainage bag) in place. One type of collection device is called a leg bag. This is a smaller drainage bag that you can wear on your leg to collect urine during the day. The bag can fit under your clothing. You can move around with greater ease when using a leg bag instead of a larger collection bag.  You were shown how to care for your catheter in the hospital. This sheet will help you remember those steps when you are at home.  Home care  · Wash your hands thoroughly before and after you care for your catheter or collection device.  · Gather your supplies:  ¨ Alcohol wipes  ¨ Soap and water  ¨ Towel and  washcloth  ¨ Leg strap and leg bag  · Use soap and water to wash the area where your catheter enters your body. Rinse well.  · Secure the bag stephenson to your leg:  ¨ Position the leg band high on your thigh with the product label pointing away from your leg.  ¨ Stretch the leg band in place and fasten.  ¨ Place the catheter tubing over the bag and secure it. You may secure it with a Velcro tab or other method, depending on the product you use. Be sure to leave enough loop in the catheter above the leg band to avoid pulling on the tube.  ¨ Every 4 to 6 hours, reposition the band to prevent pressure from the elastic on your leg. You can do this by changing the bag to the other leg or by raising or lowering the leg band.  ¨ Wash the band as frequently as needed. You can hand wash and dry the leg band.  · Place the bag in the bag stephenson.  · Clean the urine bag end of the catheter and your catheter port with an alcohol wipe.  · Place a towel under the bag and port to keep urine from dripping onto your leg.  · Before connecting the outlet valve at the bottom of the bag to the catheter, make sure that it is firmly closed. Flip the valve upward toward the bag; it needs to snap firmly in place. Be sure not to tug on the tubing. Be gentle.  · Attach the urine bag to the end of the catheter; insert the connector snugly into the catheter port. You can avoid dribbling urine by bending the catheter tubing just below the tip and holding it while you disconnect it from the catheter. Be careful to keep the tip clean while connecting the leg bag tubing to the catheter--this keeps germs from getting into the system.  · Drain the bag when it is full. To drain the bag, flip the clamp downward. Direct the flexible outlet tube to control the flow of urine. You dont have to disconnect the leg bag from the catheter to empty it. Raise your leg up to the edge of the toilet to reach the leg bag. Then you can empty the bag directly into the  toilet. This way, you wont need to bend over, which may be uncomfortable.  · Keep the leg bag clean. Rinse daily with equal parts water and vinegar to reduce odor and keep the bag free of germs.  · Remember to keep the drainage bag below the level of your bladder for proper drainage.  Follow-up  Make a follow-up appointment as directed by your healthcare provider.  When to call your healthcare provider  Call your healthcare provider immediately if you have any of the following:  · Redness, swelling, or warmth around the catheter entry site  · Pus draining from your catheter entry site or into the catheter tubing and bag  · Blood, clots, or floating debris in the urine  · Nausea and vomiting  · Shaking chills  · Fever above 100.4°F (38°C)  · Pain that is not relieved by medication  · Catheter that falls out or is dislodged   Date Last Reviewed: 9/26/2014  © 8833-1748 [employer]. [address] [place] [address]. All rights reserved. This information is not intended as a substitute for professional medical care. Always follow your healthcare professional's instructions.        Mccord Catheter Care    A Mccord catheter is a rubber tube that is placed through the urethra (opening where urine comes out) and into the bladder. This helps drain urine from the bladder. There is a small balloon on the end of the tube that is inflated after insertion. This keeps the catheter from sliding out of the bladder.  A Mccord catheter is used to treat urinary retention (unable to pass urine). It is also used when there is incontinence (loss of bladder control).  Home care  · Finish taking any prescribed antibiotic even if you are feeling better before then.  · It is important to keep bacteria from getting into the collection bag. Do not disconnect the catheter from the collection bag.  · Use a leg band to secure the drainage tube, so it does not pull on the catheter. Drain the collection bag when it becomes full  "using the drain spout at the bottom of the bag.  · Do not try to pull or remove your catheter. This will injure your urethra. It must be removed by your healthcare provider or nurse.  Follow-up care  Follow up with your healthcare provider as advised for repeat urine testing and catheter removal or replacement.  When to seek medical advice  Call your healthcare provider right away if any of these occur:  · Fever of 100.4ºF (38ºC) or higher, or as directed by your healthcare provider  · Bladder pain or fullness  · Abdominal swelling, nausea or vomiting, or back pain  · Blood or urine leakage around the catheter  · Bloody urine coming from the catheter (if a new symptom)  · Catheter falls out  · Catheter stops draining for 6 hours  · Weakness, dizziness, or fainting  Date Last Reviewed: 10/1/2016  © 9720-0896 [employer]. [address] [place] [address]. All rights reserved. This information is not intended as a substitute for professional medical care. Always follow your healthcare professional's instructions.            Primary Diagnosis     Your primary diagnosis was:  Urethral Narrowing      Admission Information     Date & Time Provider Department CSN    3/1/2017 12:27 PM Connor Hemphill MD [facility] [id number]      Care Providers     Provider Role Specialty Primary office phone    Connor Hemphill MD Attending Provider Urology [phone]    Connor Hemphill MD Surgeon  Urology [phone]      Your Vitals Were     BP                   132/65 (BP Location: Right arm, Patient Position: Sitting, BP Method: Automatic)           Recent Lab Values        2/3/2016                           4:23 AM           A1C 5.2                       Allergies as of 3/1/2017        Reactions    Aspirin Other (See Comments)    Any blood thinners cause Severe bleeding, "I'm a bleeder"    Ciprofloxacin Swelling      Advance Directives     An advance directive is a document which, in the event " you are no longer able to make decisions for yourself, tells your healthcare team what kind of treatment you do or do not want to receive, or who you would like to make those decisions for you.  If you do not currently have an advance directive, [facility] encourages you to create one.  For more information call:  [phone] ([phone]), [phone] ([phone]),  or log on to [email or web address].        Smoking Cessation     If you would like to quit smoking:   You may be eligible for free services if you are a [place] resident and started smoking cigarettes before September 1, 1988.  Call the Smoking Cessation Trust (SCT) toll free at (191) 262-7944 or (302) 216-3577.   Call 3-163-QUIT-NOW if you do not meet the above criteria.            Language Assistance Services     ATTENTION: Language assistance services are available, free of charge. Please call [phone].      ATENCIÓN: Si habla leonor, tiene a clark disposición servicios gratuitos de asistencia lingüística. Llame al [phone].     [facility] Ý: N?u b?n nói Ti?ng Vi?t, có các d?ch v? h? tr? ngôn ng? mi?n phí dành cho b?n. G?i s? [phone].        Diabetes Discharge Instructions                                    [facility] complies with applicable Federal civil rights laws and does not discriminate on the basis of race, color, national origin, age, disability, or sex.

## 2017-03-01 NOTE — ANESTHESIA PREPROCEDURE EVALUATION
03/01/2017  Raymond Elizondo is a 77 y.o., male.    [facility] Anesthesia Evaluation         Review of Systems  Anesthesia Hx:  No problems with previous Anesthesia   Social:  Non-Smoker    Cardiovascular:   Exercise tolerance: poor Hypertension Denies CAD.     Denies Angina. CHRIS  Functional Capacity Can you climb two flights of stairs? ==> Yes    Pulmonary:   COPD Denies Asthma. Shortness of breath  Denies Recent URI. Sleep Apnea, CPAP       Home O2 q Noc 2-3L    Nebulizers daily               Renal/:  Renal/ Normal     Hepatic/GI:   Denies PUD. Hiatal Hernia, GERD Liver Disease, Denies Hepatitis.         ETOH cirrhosis, gained 8lbs in last few days, prominent abdomen likely ascites.        H/o hiatal hernia repair. No sx now.                 Neurological:   Denies CVA. Denies Seizures.    Endocrine:   Diabetes Denies Hypothyroidism.        Physical Exam  General:  Well nourished    Airway/Jaw/Neck:  Airway Findings: Mouth Opening: Normal Tongue: Normal  General Airway Assessment: Adult  Mallampati: I  TM Distance: Normal, at least 6 cm  Jaw/Neck Findings:  Neck ROM: Normal ROM  Neck Findings:     Eyes/Ears/Nose:  EYES/EARS/NOSE FINDINGS: Normal   Dental:  Dental Findings: In tact, Edentulous   Chest/Lungs:  Chest/Lungs Findings: Clear to auscultation     Heart/Vascular:  Heart Findings: Rate: Normal  Rhythm: Regular Rhythm  Sounds: Normal        Mental Status:  Mental Status Findings:  Alert and Oriented         Anesthesia Plan  Type of Anesthesia, risks & benefits discussed:  Anesthesia Type:  general  Patient's Preference: Proceed with anesthesia understanding that the risks are very small but could be serious or life threatening.  Intra-op Monitoring Plan: standard ASA monitors  Intra-op Monitoring Plan Comments:   Post Op Pain Control Plan:   Post Op Pain Control Plan Comments:   Induction:   IV  Beta  Blocker:  Patient is on a Beta-Blocker and has received one dose within the past 24 hours (No further documentation required).       Informed Consent: Patient understands risks and agrees with Anesthesia plan.  Questions answered. Anesthesia consent signed with patient.  ASA Score: 3     Day of Surgery Review of History & Physical: I have interviewed and examined the patient. I have reviewed the patient's H&P dated:            Ready For Surgery From Anesthesia Perspective.

## 2017-03-01 NOTE — ANESTHESIA POSTPROCEDURE EVALUATION
"Anesthesia Post Evaluation    Patient: Raymond Elizondo    Procedure(s) Performed: Procedure(s) (LRB):  CYSTOSCOPY (N/A)  DILATION-URETHRA (N/A)  URETHROTOMY-DIRECT VISUAL INTERNAL (DVIU) (N/A)    Final Anesthesia Type: general  Patient location during evaluation: PACU  Patient participation: Yes- Able to Participate  Level of consciousness: awake and alert  Post-procedure vital signs: reviewed and stable  Pain management: adequate  Airway patency: patent  PONV status at discharge: No PONV  Anesthetic complications: no      Cardiovascular status: blood pressure returned to baseline  Respiratory status: unassisted, room air and spontaneous ventilation  Hydration status: euvolemic  Follow-up not needed.        Visit Vitals    /65 (BP Location: Right arm, Patient Position: Sitting, BP Method: Automatic)    Pulse (!) 49    Temp 37.1 °C (98.7 °F) (Skin)    Resp 16    Ht 5' 9" (1.753 m)    Wt 94.3 kg (208 lb)    SpO2 (!) 94%    BMI 30.72 kg/m2       Pain/Bennie Score: Pain Assessment Performed: Yes (3/1/2017  2:05 PM)  Bennie Score: 8 (3/1/2017  2:05 PM)      "

## 2017-03-01 NOTE — INTERVAL H&P NOTE
The patient has been examined and the H&P has been reviewed:    I concur with the findings and no changes have occurred since H&P was written.    Anesthesia/Surgery risks, benefits and alternative options discussed and understood by patient/family.    UA today negative for infection      Active Hospital Problems    Diagnosis  POA    Urethral stricture [N35.9]  Yes      Resolved Hospital Problems    Diagnosis Date Resolved POA   No resolved problems to display.

## 2017-03-01 NOTE — ANESTHESIA RELEASE NOTE
Anesthesia Release from PACU Note    Patient name: Raymond Elizondo    Procedure(s): Procedure(s) (LRB):  CYSTOSCOPY (N/A)  DILATION-URETHRA (N/A)  URETHROTOMY-DIRECT VISUAL INTERNAL (DVIU) (N/A)    Anesthesia type: general    Post pain: adequate analgesia    Post assessment: no apparent complications    Last vitals:   Vitals:    03/01/17 1444   BP: 132/65   Pulse: (!) 49   Resp: 16   Temp:        Post vital signs: stable    Level of consciousness: alert     Nausea/Vomiting: no nausea/no vomiting    Complications: none    Airway Patency:  patent    Respiratory: unassisted    Cardiovascular: stable and blood pressure at baseline    Hydration: euvolemic

## 2017-03-01 NOTE — DISCHARGE SUMMARY
[facility]  Discharge Note  Short Stay    Admit Date: 3/1/2017    Discharge Date and Time: 3/1/2017    Attending Physician: Connor Hemphill MD     Discharge Provider: Mare Rolon    Diagnoses:  Active Hospital Problems    Diagnosis  POA    *Urethral stricture [N35.9]  Yes      Resolved Hospital Problems    Diagnosis Date Resolved POA   No resolved problems to display.       Discharged Condition: good    Hospital Course: Patient was admitted for an outpatient procedure and tolerated the procedure well with no complications.    Final Diagnoses: Same as principal problem.    Disposition: Home or Self Care    Follow up/Patient Instructions:    Medications:  Reconciled Home Medications:   Current Discharge Medication List      START taking these medications    Details   amoxicillin-clavulanate 500-125mg (AUGMENTIN) 500-125 mg Tab Take 1 tablet (500 mg total) by mouth 2 (two) times daily.  Qty: 14 tablet, Refills: 0      oxycodone-acetaminophen (PERCOCET) 5-325 mg per tablet Take 1 tablet by mouth every 4 (four) hours as needed for Pain.  Qty: 21 tablet, Refills: 0      polyethylene glycol (GLYCOLAX) 17 gram PwPk Take 17 g by mouth once daily.  Qty: 30 packet, Refills: 0         CONTINUE these medications which have NOT CHANGED    Details   albuterol (PROVENTIL) 5 mg/mL nebulizer solution Take 2.5 mg by nebulization every 6 (six) hours as needed.      alprazolam (XANAX) 0.25 MG tablet Take 0.25 mg by mouth 3 (three) times daily as needed for Insomnia or Anxiety. Takes every morning and prn      amlodipine (NORVASC) 10 MG tablet Take 10 mg by mouth every morning.       atenolol (TENORMIN) 25 MG tablet Take 25 mg by mouth every morning.       BREO ELLIPTA 100-25 mcg/dose diskus inhaler Inhale 1 puff into the lungs every morning.       bumetanide (BUMEX) 1 MG tablet Take 1 mg by mouth every morning.      fenofibrate 160 MG Tab Take 160 mg by mouth every morning.       glipiZIDE (GLUCOTROL) 5 MG tablet Take  2.5 mg by mouth daily with breakfast. Takes half of a 5 mg tablet every morning      lorazepam (ATIVAN) 1 MG tablet Take 1 mg by mouth every 6 (six) hours as needed for Anxiety. Takes two 1 mg tablets every morning and takes 1 mg every bedtime      nitroGLYCERIN (NITROSTAT) 0.4 MG SL tablet Place 0.4 mg under the tongue every 5 (five) minutes as needed for Chest pain.      omeprazole (PRILOSEC) 20 MG capsule Take 20 mg by mouth every morning.       potassium chloride (MICRO-K) 10 MEQ CpSR Take 10 mEq by mouth every morning.       tramadol (ULTRAM) 50 mg tablet Take 50 mg by mouth every 6 (six) hours as needed for Pain. Takes two 50 mg tablets every morning and takes 50 mg every evening.      diphenoxylate-atropine 2.5-0.025 mg (LOMOTIL) 2.5-0.025 mg per tablet Take 2 tablets by mouth every 6 (six) hours as needed.  Refills: 5      pravastatin (PRAVACHOL) 40 MG tablet Take 40 mg by mouth every morning.       tamsulosin (FLOMAX) 0.4 mg Cp24 Take 1 capsule (0.4 mg total) by mouth every evening.  Qty: 90 capsule, Refills: 3    Comments: **Patient requests 90 days supply**  Associated Diagnoses: Weak urine stream      trazodone (DESYREL) 150 MG tablet Take 150 mg by mouth every evening.  Refills: 5      VENTOLIN HFA 90 mcg/actuation inhaler Inhale 2 puffs into the lungs every 6 (six) hours as needed.              Discharge Procedure Orders  Diet general     Activity as tolerated     Call MD for:  temperature >100.4     Call MD for:  persistent nausea and vomiting     Call MD for:  severe uncontrolled pain     No dressing needed       Follow-up Information     Follow up with Ibeth Ponce NP On 3/6/2017.    Specialty:  Urology    Why:  leyva removal, teach CIC    Contact information:    [address] [place] [id number] [phone]          Follow up with Connor Hemphill MD In 3 months.    Specialty:  Urology    Why:  symptom check    Contact information:    [address] [place]  [id number]  [phone]              Mare Rolon MD  Urology, PGY-2  Pager# [phone]

## 2017-03-01 NOTE — OP NOTE
[facility]  Operative Note    Date: 03/01/2017    Pre-Op Diagnosis: urethral stricture    Post-Op Diagnosis: same    Procedure(s) Performed:   1.  Cystoscopy with DVIU  2.  Fulguration  3.  Fluoro < 1 hour    Specimen(s): none    Staff Surgeon: Connor Hemphill MD    Assistant Surgeon: Mare Rolon MD    Anesthesia: General endotracheal anesthesia    Indications: Raymond Elizondo is a 77 y.o. male with urethral stricture presenting for endoscopic management.      Findings: tight stricture in bulbar urethra    Estimated Blood Loss: min    Drains: 22 Fr leyva catheter    Procedure in Detail:  After risks, benefits and possible complications of cystoscopy were explained, the patient elected to undergo the procedure and informed consent was obtained. All questions were answered in the brigido-operative area. The patient was transferred to the cystoscopy suite and placed in the supine position.  SCDs were applied and working.  Anesthesia was administered.  Once the patient was adequately sedated, he was placed in the dorsal lithotomy position and prepped and draped in the usual sterile fashion.  Time out was performed, brigido-procedural antibiotics were confirmed.     A 22 Fr cystoscope was inserted into the urethra. A stricture was seen in the bulbar urethra. A guide wire was passed through the stricture and into the bladder, confirmed with fluoro. The scope was removed keeping the wire in place.      A urethrotome in a 22 Fr sheath was introduced into the urethra The urethrotome was used to cut the stricture at the 12 o'clock position until bleeding was visualized.      The scope was then passed through the rest of the urethra and into the bladder.  The right and left ureteral orifices were identified in the normal anatomic position.    There were some areas in the urethra noted to have profuse bleeding. There were fulgurated with the [place].     A 22 Fr [place] tip catheter was passed over the wire.      The bladder was drained, and the patient was removed from lithotomy.     The patient tolerated the procedure well and was transferred to recovery in stable condition.    Disposition:  The patient will follow up with Dr. Hemphill in 3 months. He will follow up with a NP for leyva removal on Monday. The patient was given prescriptions for Augmentin, percocet, miralax.      Mare Rolon MD

## 2017-03-01 NOTE — TRANSFER OF CARE
"Anesthesia Transfer of Care Note    Patient: Raymond Elizondo    Procedure(s) Performed: Procedure(s) (LRB):  CYSTOSCOPY (N/A)  DILATION-URETHRA (N/A)  URETHROTOMY-DIRECT VISUAL INTERNAL (DVIU) (N/A)    Patient location: PACU    Anesthesia Type: general    Transport from OR: Transported from OR on 6-10 L/min O2 by face mask with adequate spontaneous ventilation    Post pain: adequate analgesia    Post assessment: no apparent anesthetic complications    Post vital signs: stable    Level of consciousness: awake    Nausea/Vomiting: no nausea/vomiting    Complications: none          Last vitals:   Visit Vitals    BP (!) 148/82 (BP Location: Left arm, Patient Position: Lying, BP Method: Automatic)    Pulse (!) 52    Temp 36.7 °C (98.1 °F) (Oral)    Resp 20    Ht 5' 9" (1.753 m)    Wt 94.3 kg (208 lb)    SpO2 95%    BMI 30.72 kg/m2     "

## 2017-03-06 ENCOUNTER — OFFICE VISIT (OUTPATIENT)
Dept: UROLOGY | Facility: CLINIC | Age: 77
End: 2017-03-06
Payer: MEDICARE

## 2017-03-06 DIAGNOSIS — N35.9 URETHRAL STRICTURE, UNSPECIFIED STRICTURE TYPE: ICD-10-CM

## 2017-03-06 DIAGNOSIS — Z98.890 POST-OPERATIVE STATE: Primary | ICD-10-CM

## 2017-03-06 DIAGNOSIS — R33.9 URINARY RETENTION: ICD-10-CM

## 2017-03-06 PROCEDURE — 51700 IRRIGATION OF BLADDER: CPT | Mod: S$GLB,,, | Performed by: NURSE PRACTITIONER

## 2017-03-06 PROCEDURE — 99999 PR PBB SHADOW E&M-EST. PATIENT-LVL V: CPT | Mod: PBBFAC,,, | Performed by: NURSE PRACTITIONER

## 2017-03-06 PROCEDURE — 99024 POSTOP FOLLOW-UP VISIT: CPT | Mod: S$GLB,,, | Performed by: NURSE PRACTITIONER

## 2017-03-06 RX ORDER — POTASSIUM CHLORIDE 750 MG/1
10 TABLET, EXTENDED RELEASE ORAL 2 TIMES DAILY
Refills: 1 | COMMUNITY
Start: 2017-02-17

## 2017-03-06 NOTE — PATIENT INSTRUCTIONS
CIC daily x 30 days  CIC 3 x a week for 30 days  CIC weekly for 30 days      Discharge Instructions: Self-Catheterization for Men  Your doctor has prescribed self-catheterization for you because you are having trouble urinating naturally. This problem can be caused by injury, disease, infection, or other conditions. Self-catheterization simply means inserting a clean catheter (a thin, flexible tube) into the bladder to empty urine. This helps you empty your bladder when it wont empty by itself or empty all the way. You were shown in the hospital how to perform self-catheterization. The steps below should help you remember how to do it properly.     Lubricate catheter       Insert catheter       Empty urine      Gather Your Supplies  You will need the following:  · Soap and warm water or a moist towelette  · Clean catheter  · Water-soluble lubricating jelly (not Vaseline or other petroleum jelly)  · Toilet or basin  Get Ready  · Wash your hands and your penis. Use warm soapy water. You can also use a moist towelette.  · Lubricate the catheter with the water-soluble lubricating jelly.  ¨ Lubricate 2 to 4 inches of the catheter tip.  ¨ Place the other end of the catheter over the toilet or basin.  Empty Your Bladder  · Insert the catheter.  ¨ Grasp the tip of your penis, holding your penis horizontally from your body..  ¨ Slowly insert the catheter into your urethra (urinary tract). If it doesnt go in, do this: Take a deep breath and bear down as if you're trying to urinate.  ¨ If you feel a sharp pain, remove the catheter; then try again.  · Empty your bladder.  ¨ When the urine starts to flow, stop inserting the catheter.  ¨ Slightly lower your penis.  ¨ When the urine stops flowing, slowly remove the catheter.  Clean Up  · Wash the catheter in mild soap and water.  · Rinse the catheter well.  Follow-Up  Make a follow-up appointment as directed by our staff.  When to Call Your Doctor  Call your doctor right away if  you have any of the following:  · Fever above 101.4°F (38°C) or chills  · Burning in the urinary tract, penis, or pubic area  · Nausea and vomiting  · Aching in the lower back  · Cloudy urine; sediment or mucus in the urine  · Bloody (pink or red) or foul-smelling urine   Date Last Reviewed: 9/29/2014  © 7807-2244 [employer]. [address] [place] [address], [place] [id number]. All rights reserved. This information is not intended as a substitute for professional medical care. Always follow your healthcare professional's instructions.

## 2017-03-06 NOTE — MR AVS SNAPSHOT
[facility]  [address]  [facility] [address]  Phone: [phone]                  Raymond Elizondo   3/6/2017 10:40 AM   Office Visit    Description:  Male : 1940   Provider:  Ibeth Ponce NP   Department:  [facility]           Reason for Visit     Urinary Retention           Diagnoses this Visit        Comments    Post-operative state    -  Primary     Urethral stricture, unspecified stricture type         Urinary retention                To Do List           Goals (5 Years of Data)     None      Follow-Up and Disposition     Return in about 3 months (around 2017).      [facility]     [facility] On Call Nurse Care Line -  Assistance  Registered nurses in the [facility] On Call Center provide clinical advisement, health education, appointment booking, and other advisory services.  Call for this free service at [phone].             Medications                Verify that the below list of medications is an accurate representation of the medications you are currently taking.  If none reported, the list may be blank. If incorrect, please contact your healthcare provider. Carry this list with you in case of emergency.           Current Medications     albuterol (PROVENTIL) 5 mg/mL nebulizer solution Take 2.5 mg by nebulization every 6 (six) hours as needed.    alprazolam (XANAX) 0.25 MG tablet Take 0.25 mg by mouth 3 (three) times daily as needed for Insomnia or Anxiety. Takes every morning and prn    amlodipine (NORVASC) 10 MG tablet Take 10 mg by mouth every morning.     amoxicillin-clavulanate 500-125mg (AUGMENTIN) 500-125 mg Tab Take 1 tablet (500 mg total) by mouth 2 (two) times daily.    atenolol (TENORMIN) 25 MG tablet Take 25 mg by mouth every morning.     BREO ELLIPTA 100-25 mcg/dose diskus inhaler Inhale 1 puff into the lungs every morning.     bumetanide (BUMEX) 1 MG tablet Take 1 mg by mouth every morning.    diphenoxylate-atropine  2.5-0.025 mg (LOMOTIL) 2.5-0.025 mg per tablet Take 2 tablets by mouth every 6 (six) hours as needed.    fenofibrate 160 MG Tab Take 160 mg by mouth every morning.     glipiZIDE (GLUCOTROL) 5 MG tablet Take 2.5 mg by mouth daily with breakfast. Takes half of a 5 mg tablet every morning    lorazepam (ATIVAN) 1 MG tablet Take 1 mg by mouth every 6 (six) hours as needed for Anxiety. Takes two 1 mg tablets every morning and takes 1 mg every bedtime    nitroGLYCERIN (NITROSTAT) 0.4 MG SL tablet Place 0.4 mg under the tongue every 5 (five) minutes as needed for Chest pain.    omeprazole (PRILOSEC) 20 MG capsule Take 20 mg by mouth every morning.     oxycodone-acetaminophen (PERCOCET) 5-325 mg per tablet Take 1 tablet by mouth every 4 (four) hours as needed for Pain.    polyethylene glycol (GLYCOLAX) 17 gram PwPk Take 17 g by mouth once daily.    potassium chloride (MICRO-K) 10 MEQ CpSR Take 10 mEq by mouth every morning.     potassium chloride SA (K-DUR,KLOR-CON) 10 MEQ tablet Take 10 mEq by mouth 2 (two) times daily.    pravastatin (PRAVACHOL) 40 MG tablet Take 40 mg by mouth every morning.     tamsulosin (FLOMAX) 0.4 mg Cp24 Take 1 capsule (0.4 mg total) by mouth every evening.    tramadol (ULTRAM) 50 mg tablet Take 50 mg by mouth every 6 (six) hours as needed for Pain. Takes two 50 mg tablets every morning and takes 50 mg every evening.    trazodone (DESYREL) 150 MG tablet Take 150 mg by mouth every evening.    VENTOLIN HFA 90 mcg/actuation inhaler Inhale 2 puffs into the lungs every 6 (six) hours as needed.            Clinical Reference Information           Allergies as of 3/6/2017     Aspirin    Ciprofloxacin      Immunizations Administered on Date of Encounter - 3/6/2017     None      Instructions    CIC daily x 30 days  CIC 3 x a week for 30 days  CIC weekly for 30 days      Discharge Instructions: Self-Catheterization for Men  Your doctor has prescribed self-catheterization for you because you are having trouble  urinating naturally. This problem can be caused by injury, disease, infection, or other conditions. Self-catheterization simply means inserting a clean catheter (a thin, flexible tube) into the bladder to empty urine. This helps you empty your bladder when it wont empty by itself or empty all the way. You were shown in the hospital how to perform self-catheterization. The steps below should help you remember how to do it properly.     Lubricate catheter       Insert catheter       Empty urine      Gather Your Supplies  You will need the following:  · Soap and warm water or a moist towelette  · Clean catheter  · Water-soluble lubricating jelly (not Vaseline or other petroleum jelly)  · Toilet or basin  Get Ready  · Wash your hands and your penis. Use warm soapy water. You can also use a moist towelette.  · Lubricate the catheter with the water-soluble lubricating jelly.  ¨ Lubricate 2 to 4 inches of the catheter tip.  ¨ Place the other end of the catheter over the toilet or basin.  Empty Your Bladder  · Insert the catheter.  ¨ Grasp the tip of your penis, holding your penis horizontally from your body..  ¨ Slowly insert the catheter into your urethra (urinary tract). If it doesnt go in, do this: Take a deep breath and bear down as if you're trying to urinate.  ¨ If you feel a sharp pain, remove the catheter; then try again.  · Empty your bladder.  ¨ When the urine starts to flow, stop inserting the catheter.  ¨ Slightly lower your penis.  ¨ When the urine stops flowing, slowly remove the catheter.  Clean Up  · Wash the catheter in mild soap and water.  · Rinse the catheter well.  Follow-Up  Make a follow-up appointment as directed by our staff.  When to Call Your Doctor  Call your doctor right away if you have any of the following:  · Fever above 101.4°F (38°C) or chills  · Burning in the urinary tract, penis, or pubic area  · Nausea and vomiting  · Aching in the lower back  · Cloudy urine; sediment or mucus in the  urine  · Bloody (pink or red) or foul-smelling urine   Date Last Reviewed: 9/29/2014  © 4708-4630 [employer]. [address] [place] [address], [place] [address]. All rights reserved. This information is not intended as a substitute for professional medical care. Always follow your healthcare professional's instructions.             Language Assistance Services     ATTENTION: Language assistance services are available, free of charge. Please call [phone].      ATENCIÓN: Si habla español, tiene a clark disposición servicios gratuitos de asistencia lingüística. Llame al [phone].     CHÚ Ý: N?u b?n nói Ti?ng Vi?t, có các d?ch v? h? tr? ngôn ng? mi?n phí dành cho b?n. G?i s? [phone].         [facility] complies with applicable Federal civil rights laws and does not discriminate on the basis of race, color, national origin, age, disability, or sex.

## 2017-03-06 NOTE — PROGRESS NOTES
CHIEF COMPLAINT:    Mr. Elizondo is a 77 y.o. male presenting for post op visit.    PRESENTING ILLNESS:    Raymond Elizondo is a 77 y.o. male with a PMH of prostate cancer and urethral stricture who presents for post op visit after cysto and DVIU. He is here for VT and CIC teaching.   He was last seen with Dr. Hemphill on 11/4/16 for a post op visit.     s/p cysto and urethral dilation for meatal stenosis, membranous urethral stricture, and CO2 laser and excisional bx of multiple penile lesions on 11/2/16 with Dr. Hemphill. On 11/4/16, leyva catheter was pulled but pt returned to the ED for urinary retention. Leyva reinserted at ED.   Prior to procedures, he had c/o hesitancy, difficult voiding, and dribbling.     s/p brachytherapy combined with external beam radiation therapy for prostate cancer at  10 years ago.  Hx of urinary retention with blood clots.     Today, he is here for f/u for after cysto with DVIU on 3/1/17.   He reports he is doing well but having penile irritation and pain from the catheter.   Denies hematuria, abdominal pain, flank pain, f/c, and n/v.     Date: 03/01/2017     Pre-Op Diagnosis: urethral stricture     Post-Op Diagnosis: same     Procedure(s) Performed:   1. Cystoscopy with DVIU  2. Fulguration  3. Fluoro < 1 hour     Specimen(s): none     Staff Surgeon: Connor Hemphill MD      Indications: Raymond Elizondo is a 77 y.o. male with urethral stricture presenting for endoscopic management.      Findings: tight stricture in bulbar urethra      REVIEW OF SYSTEMS:    Review of Systems    Constitutional: Negative for fever and chills.   HENT: Negative for hearing loss.   Eyes: Negative for visual disturbance.   Respiratory: Negative for shortness of breath.   Cardiovascular: Negative for chest pain.   Gastrointestinal: Negative for nausea, vomiting, and constipation.   Genitourinary:  See above  Neurological: Negative for dizziness.   Hematological: Does not bruise/bleed easily.    Psychiatric/Behavioral: Negative for confusion.       PATIENT HISTORY:    Past Medical History:   Diagnosis Date    Cirrhosis     COPD (chronic obstructive pulmonary disease)     Coronary artery disease     Diabetes mellitus     Esophageal cancer     Hypertension     Irregular heart beat     Pancreatitis     3x    Prostate cancer     Skin cancer     multiple sites    Urinary tract infection        Past Surgical History:   Procedure Laterality Date    APPENDECTOMY      CHOLECYSTECTOMY  2001    ELBOW FRACTURE SURGERY Right          ESOPHAGEAL RECONSTRUCTION  2011    KNEE ARTHROSCOPY W/ MENISCAL REPAIR Right          PROSTATE BIOPSY  2016    SKIN CANCER EXCISION      Left ear, Left flank, Right flank    TONSILLECTOMY         Family History   Problem Relation Age of Onset    Diabetes Mother     Cancer Mother     Cancer Father     Prostate cancer Neg Hx     Kidney disease Neg Hx        Social History     Social History    Marital status:      Spouse name: N/A    Number of children: N/A    Years of education: N/A     Occupational History    Not on file.     Social History Main Topics    Smoking status: Former Smoker     Packs/day: 0.25     Years: 0.50    Smokeless tobacco: Not on file    Alcohol use No    Drug use: No    Sexual activity: Not Currently     Other Topics Concern    Not on file     Social History Narrative       Allergies:  Aspirin and Ciprofloxacin    Medications:    Current Outpatient Prescriptions:     albuterol (PROVENTIL) 5 mg/mL nebulizer solution, Take 2.5 mg by nebulization every 6 (six) hours as needed., Disp: , Rfl:     alprazolam (XANAX) 0.25 MG tablet, Take 0.25 mg by mouth 3 (three) times daily as needed for Insomnia or Anxiety. Takes every morning and prn, Disp: , Rfl:     amlodipine (NORVASC) 10 MG tablet, Take 10 mg by mouth every morning. , Disp: , Rfl:     amoxicillin-clavulanate 500-125mg (AUGMENTIN) 500-125 mg Tab, Take 1 tablet (500 mg total)  by mouth 2 (two) times daily., Disp: 14 tablet, Rfl: 0    atenolol (TENORMIN) 25 MG tablet, Take 25 mg by mouth every morning. , Disp: , Rfl:     BREO ELLIPTA 100-25 mcg/dose diskus inhaler, Inhale 1 puff into the lungs every morning. , Disp: , Rfl:     bumetanide (BUMEX) 1 MG tablet, Take 1 mg by mouth every morning., Disp: , Rfl:     diphenoxylate-atropine 2.5-0.025 mg (LOMOTIL) 2.5-0.025 mg per tablet, Take 2 tablets by mouth every 6 (six) hours as needed., Disp: , Rfl: 5    fenofibrate 160 MG Tab, Take 160 mg by mouth every morning. , Disp: , Rfl:     glipiZIDE (GLUCOTROL) 5 MG tablet, Take 2.5 mg by mouth daily with breakfast. Takes half of a 5 mg tablet every morning, Disp: , Rfl:     lorazepam (ATIVAN) 1 MG tablet, Take 1 mg by mouth every 6 (six) hours as needed for Anxiety. Takes two 1 mg tablets every morning and takes 1 mg every bedtime, Disp: , Rfl:     nitroGLYCERIN (NITROSTAT) 0.4 MG SL tablet, Place 0.4 mg under the tongue every 5 (five) minutes as needed for Chest pain., Disp: , Rfl:     omeprazole (PRILOSEC) 20 MG capsule, Take 20 mg by mouth every morning. , Disp: , Rfl:     oxycodone-acetaminophen (PERCOCET) 5-325 mg per tablet, Take 1 tablet by mouth every 4 (four) hours as needed for Pain., Disp: 21 tablet, Rfl: 0    polyethylene glycol (GLYCOLAX) 17 gram PwPk, Take 17 g by mouth once daily., Disp: 30 packet, Rfl: 0    potassium chloride (MICRO-K) 10 MEQ CpSR, Take 10 mEq by mouth every morning. , Disp: , Rfl:     potassium chloride SA (K-DUR,KLOR-CON) 10 MEQ tablet, Take 10 mEq by mouth 2 (two) times daily., Disp: , Rfl: 1    pravastatin (PRAVACHOL) 40 MG tablet, Take 40 mg by mouth every morning. , Disp: , Rfl:     tamsulosin (FLOMAX) 0.4 mg Cp24, Take 1 capsule (0.4 mg total) by mouth every evening., Disp: 90 capsule, Rfl: 3    tramadol (ULTRAM) 50 mg tablet, Take 50 mg by mouth every 6 (six) hours as needed for Pain. Takes two 50 mg tablets every morning and takes 50 mg every  evening., Disp: , Rfl:     trazodone (DESYREL) 150 MG tablet, Take 150 mg by mouth every evening., Disp: , Rfl: 5    VENTOLIN HFA 90 mcg/actuation inhaler, Inhale 2 puffs into the lungs every 6 (six) hours as needed. , Disp: , Rfl:     PHYSICAL EXAMINATION:    Constitutional: He is oriented to person, place, and time. He appears well-developed and well-nourished.  He is in no apparent distress.    Neck: No tracheal deviation present.     Cardiovascular: Normal rate.      Pulmonary/Chest: Effort normal. No respiratory distress.     Abdominal:  He exhibits no distension.     Neurological: He is alert and oriented to person, place, and time.     Skin: Skin is warm and dry.     Psych: Cooperative with normal affect.      Physical Exam      LABS:    Lab Results   Component Value Date    PSADIAG <0.01 06/27/2016       IMPRESSION:    Encounter Diagnoses   Name Primary?    Post-operative state Yes    Urethral stricture, unspecified stricture type     Urinary retention        PLAN:    -Discussed post op expectations.   Voiding trial performed by Nurse Leahy. 120 ml of sterile water was instilled into bladder.  Leyva catheter was removed. Patient urinated  120 ml without difficulty. PVR 40 cc.   Voiding trial passed.  Patient was instructed to drink plenty of fluids today.  Informed patient to return to clinic or emergency department (if after clinic hours) to have leyva catheter put back in if unable to urinate within 5 hours of leyva catheter removal or starts to experience bladder pressure/pain, decrease flow, straining/difficulty urinating.    -Discussed CIC. Educated him and instructed him on CIC. He demonstrated with no difficulties.   -CIC daily x 30 days  CIC 3 x a week for 30 days  CIC weekly for 30 days.   PRN indefinitely for urinary retention.   -RTC in 3 months  Patient voiced understanding  I encouraged him or any of his family members to call or email me with questions and/or concerns.    Ibeth Ponce,  FNP-C

## 2017-03-07 ENCOUNTER — CLINICAL SUPPORT (OUTPATIENT)
Dept: SMOKING CESSATION | Facility: CLINIC | Age: 77
End: 2017-03-07
Payer: COMMERCIAL

## 2017-03-07 DIAGNOSIS — F17.200 NICOTINE DEPENDENCE: Primary | ICD-10-CM

## 2017-03-07 PROCEDURE — 99407 BEHAV CHNG SMOKING > 10 MIN: CPT | Mod: S$GLB,,, | Performed by: GENERAL PRACTICE

## 2017-03-27 ENCOUNTER — PATIENT MESSAGE (OUTPATIENT)
Dept: UROLOGY | Facility: CLINIC | Age: 77
End: 2017-03-27

## 2017-04-02 ENCOUNTER — PATIENT MESSAGE (OUTPATIENT)
Dept: UROLOGY | Facility: CLINIC | Age: 77
End: 2017-04-02

## 2017-05-30 ENCOUNTER — ANESTHESIA EVENT (OUTPATIENT)
Dept: SURGERY | Facility: HOSPITAL | Age: 77
End: 2017-05-30
Payer: MEDICARE

## 2017-05-30 NOTE — PLAN OF CARE
Patient scheduled for surgery on 6/8/17.  Not scheduled for a PAT appt and pt would rather not come for one.  Has recently had surgery at another facility- chart reviewed by .  Called and spoke with him on phone - gave preop instructions.  Arrive that day at 1100.  Stop at desk in lobby and do registration.  Have ride home.  Wear something comfortable.  Leave valuables at home.  NPO after MN night before, except may eat light breakfast no later than 0500.  Take with water am of surgery: Amlodipine, Atenolol, Xanax (normally takes in morning), Omeprazole, and Albuterol.

## 2017-06-07 NOTE — H&P
Reason For Visit   Followup evaluation for olecranon bursitis.     HISTORY OF PRESENT ILLNESS:  Mr. Elizondo returns today for evaluation of his  left olecranon bursitis.  He was previously scheduled for surgery but had to cancel  due to other medical issues.  He has had treatment for his prostate cancer and UTI.  He would like to proceed with surgery at this time..  Allergies   Rec: [id number].  List Reconciled and Reviewed.  Ciprofloxacin SOLN  No Known Environmental Allergies  No Known Food Allergies.  Current Meds   Rec: [id number].  List Reconciled and Reviewed.  Albuterol Sulfate (2.5 MG/3ML) 0.083% Inhalation Nebulization Solution;4 times a day; RPT  Fenofibrate 160 MG Oral Tablet;TAKE 1 TABLET DAILY.; RPT  LORazepam 1 MG Oral Tablet;TAKE 2 TABLET BEDTIME; RPT  Nitroglycerin 0.4 MG Sublingual Tablet Sublingual;PLACE 1 TABLET UNDER THE TONGUE EVERY 5 MINUTES FOR UP TO 3 DOSES AS NEEDED FOR CHEST PAIN.CALL 911 IF PAIN PERSISTS.; RPT  TraMADol HCl - 50 MG Oral Tablet;TAKE 1 TABLET 4 TIMES DAILY AS NEEDED.; RPT  Omeprazole 20 MG Oral Tablet Delayed Release;TAKE 1 TABLET DAILY; RPT  Breo Ellipta 100-25 MCG/INH Inhalation Aerosol Powder Breath Activated;USE 1 INHALATION ONCE DAILY.; RPT  Atenolol 25 MG Oral Tablet;TAKE 1 TABLET DAILY.; RPT  ALPRAZolam 0.25 MG Oral Tablet;TAKE 1 TABLET DAILY.; RPT  Ventolin  (90 Base) MCG/ACT Inhalation Aerosol Solution;INHALE 2 PUFFS BY MOUTH EVERY 4 HOURS AS NEEDED FOR COUGH AND WHEEZING; RPT  * medication not on pt's updated list, 30 Sep 2015  GlipiZIDE 5 MG Oral Tablet;TAKE 1 TABLET DAILY; RPT  Tamsulosin HCl 0.4 MG CP24;ONE TABLET AT NIGHT WITH MEAL; RPT  Bumetanide 2 MG Oral Tablet;take 1/2 pill daily; RPT  Potassium Chloride ER 10 MEQ Oral Tablet Extended Release;TAKE ONE TABLET BY MOUTH TWICE DAILY; Rx  Atenolol 25 MG Oral Tablet;TAKE 1 TABLET EVERY DAY; Rx  LORazepam 2 MG Oral Tablet;TAKE 1 TABLET TWICE DAILY; RPT  Pravastatin Sodium 40 MG Oral Tablet;TAKE 1 TABLET  EVERY DAY; Rx  AmLODIPine Besylate 10 MG Oral Tablet;TAKE 1 TABLET EVERY DAY; Rx.  PMH   Acute Myocardial Infarction (V12.59)  Diabetes mellitus (250.00) (E11.9)  Diarrhea (787.91) (R19.7)  Esophageal Cancer (V10.03); 2 years treated with chemo and xrt  Heartburn symptom (787.1) (R12)  Hyperlipidemia (272.4) (E78.5)  Hypertension (401.9) (I10).  PSH   Appendectomy  Esophagectomy Wide.  Family Hx   Diabetes Mellitus (V18.0)  Diabetes Mellitus: Mother (V18.0)  Lung Cancer: Father (V16.1)  Prostate Cancer: Father (V16.42).  Personal Hx   Denied Alcohol Use (History)  Current smoker (305.1) (Z72.0); Cigarettes  Denied Drug Use  Former smoker (V15.82) (Z87.891).  ROS   See HPI  no dizziness.  Vital Signs    Recorded by Ruben Godwin on 23 May 2017 11:41 AM  BP:110/68,   HR: 52 b/min,   Height: 5 ft 9 in, Weight: 211 lb , BMI: 31.2 kg/m2,   BMI Calculated: 31.16 ,   BSA Calculated: 2.11 ,   Pain Scale: 0.  Physical Exam   GENERAL:  Alert male in no acute distress.  Appears stated age of 77 years old.  SKIN:  Intact over left elbow.  No cellulitis.  LYMPHATICS:  No lymphedema in left upper extremity.  MUSCULOSKELETAL:  Left elbow probable fluid collection over the left olecranon tip.  Passive range of motion of left elbow nonpainful.  Neurovascular exam left upper  extremity intact..  Assessment   Olecranon bursitis of left elbow (726.33) (M70.22).  Plan   We have scheduled the patient for left olecranon bursectomy on June 8, 2017.   Risks, benefits, and alternatives were discussed and wishes to proceed..    There are no interval changes to report in the history and physical exam.

## 2017-06-08 ENCOUNTER — ANESTHESIA (OUTPATIENT)
Dept: SURGERY | Facility: HOSPITAL | Age: 77
End: 2017-06-08
Payer: MEDICARE

## 2017-06-08 ENCOUNTER — SURGERY (OUTPATIENT)
Age: 77
End: 2017-06-08

## 2017-06-08 ENCOUNTER — HOSPITAL ENCOUNTER (OUTPATIENT)
Facility: HOSPITAL | Age: 77
Discharge: HOME | End: 2017-06-08
Attending: ORTHOPAEDIC SURGERY | Admitting: ORTHOPAEDIC SURGERY
Payer: MEDICARE

## 2017-06-08 DIAGNOSIS — M70.22 OLECRANON BURSITIS, LEFT ELBOW: Primary | ICD-10-CM

## 2017-06-08 LAB
ANION GAP SERPL CALC-SCNC: 10 MMOL/L
BASOPHILS # BLD AUTO: 0.04 K/UL
BASOPHILS NFR BLD: 0.7 %
BUN SERPL-MCNC: 29 MG/DL
CALCIUM SERPL-MCNC: 10.8 MG/DL
CHLORIDE SERPL-SCNC: 103 MMOL/L
CO2 SERPL-SCNC: 24 MMOL/L
CREAT SERPL-MCNC: 1.6 MG/DL
DIFFERENTIAL METHOD: ABNORMAL
EOSINOPHIL # BLD AUTO: 0.4 K/UL
EOSINOPHIL NFR BLD: 7.5 %
ERYTHROCYTE [DISTWIDTH] IN BLOOD BY AUTOMATED COUNT: 16 %
EST. GFR  (AFRICAN AMERICAN): 48 ML/MIN/1.73 M^2
EST. GFR  (NON AFRICAN AMERICAN): 41 ML/MIN/1.73 M^2
GLUCOSE SERPL-MCNC: 116 MG/DL
HCT VFR BLD AUTO: 38.3 %
HGB BLD-MCNC: 13.2 G/DL
LYMPHOCYTES # BLD AUTO: 1.4 K/UL
LYMPHOCYTES NFR BLD: 25 %
MCH RBC QN AUTO: 30.2 PG
MCHC RBC AUTO-ENTMCNC: 34.5 %
MCV RBC AUTO: 88 FL
MONOCYTES # BLD AUTO: 0.6 K/UL
MONOCYTES NFR BLD: 11.2 %
NEUTROPHILS # BLD AUTO: 3 K/UL
NEUTROPHILS NFR BLD: 55.2 %
PLATELET # BLD AUTO: 157 K/UL
PMV BLD AUTO: 8.9 FL
POTASSIUM SERPL-SCNC: 3.4 MMOL/L
RBC # BLD AUTO: 4.37 M/UL
SODIUM SERPL-SCNC: 137 MMOL/L
WBC # BLD AUTO: 5.44 K/UL

## 2017-06-08 PROCEDURE — 36415 COLL VENOUS BLD VENIPUNCTURE: CPT

## 2017-06-08 PROCEDURE — 85025 COMPLETE CBC W/AUTO DIFF WBC: CPT

## 2017-06-08 PROCEDURE — 93010 ELECTROCARDIOGRAM REPORT: CPT | Mod: S$GLB,,, | Performed by: INTERNAL MEDICINE

## 2017-06-08 PROCEDURE — 25000003 PHARM REV CODE 250: Performed by: NURSE ANESTHETIST, CERTIFIED REGISTERED

## 2017-06-08 PROCEDURE — 63600175 PHARM REV CODE 636 W HCPCS: Performed by: ANESTHESIOLOGY

## 2017-06-08 PROCEDURE — 36000708 HC OR TIME LEV III 1ST 15 MIN: Performed by: ORTHOPAEDIC SURGERY

## 2017-06-08 PROCEDURE — 71000016 HC POSTOP RECOV ADDL HR: Performed by: ORTHOPAEDIC SURGERY

## 2017-06-08 PROCEDURE — 71000015 HC POSTOP RECOV 1ST HR: Performed by: ORTHOPAEDIC SURGERY

## 2017-06-08 PROCEDURE — 63600175 PHARM REV CODE 636 W HCPCS: Performed by: ORTHOPAEDIC SURGERY

## 2017-06-08 PROCEDURE — 37000009 HC ANESTHESIA EA ADD 15 MINS: Performed by: ORTHOPAEDIC SURGERY

## 2017-06-08 PROCEDURE — 36000709 HC OR TIME LEV III EA ADD 15 MIN: Performed by: ORTHOPAEDIC SURGERY

## 2017-06-08 PROCEDURE — 88305 TISSUE EXAM BY PATHOLOGIST: CPT | Mod: 26,,, | Performed by: PATHOLOGY

## 2017-06-08 PROCEDURE — 25000003 PHARM REV CODE 250: Performed by: ANESTHESIOLOGY

## 2017-06-08 PROCEDURE — 93005 ELECTROCARDIOGRAM TRACING: CPT | Mod: 59

## 2017-06-08 PROCEDURE — 80048 BASIC METABOLIC PNL TOTAL CA: CPT

## 2017-06-08 PROCEDURE — 37000008 HC ANESTHESIA 1ST 15 MINUTES: Performed by: ORTHOPAEDIC SURGERY

## 2017-06-08 PROCEDURE — 63600175 PHARM REV CODE 636 W HCPCS: Performed by: NURSE ANESTHETIST, CERTIFIED REGISTERED

## 2017-06-08 PROCEDURE — 88305 TISSUE EXAM BY PATHOLOGIST: CPT | Performed by: PATHOLOGY

## 2017-06-08 PROCEDURE — 76942 ECHO GUIDE FOR BIOPSY: CPT | Performed by: ANESTHESIOLOGY

## 2017-06-08 RX ORDER — PROPOFOL 10 MG/ML
VIAL (ML) INTRAVENOUS
Status: DISCONTINUED | OUTPATIENT
Start: 2017-06-08 | End: 2017-06-08

## 2017-06-08 RX ORDER — FENTANYL CITRATE 50 UG/ML
INJECTION, SOLUTION INTRAMUSCULAR; INTRAVENOUS
Status: DISCONTINUED | OUTPATIENT
Start: 2017-06-08 | End: 2017-06-08

## 2017-06-08 RX ORDER — ROPIVACAINE HYDROCHLORIDE 5 MG/ML
INJECTION, SOLUTION EPIDURAL; INFILTRATION; PERINEURAL
Status: DISCONTINUED | OUTPATIENT
Start: 2017-06-08 | End: 2017-06-08

## 2017-06-08 RX ORDER — LIDOCAINE HCL/EPINEPHRINE/PF 2%-1:200K
VIAL (ML) INJECTION
Status: DISCONTINUED | OUTPATIENT
Start: 2017-06-08 | End: 2017-06-08

## 2017-06-08 RX ORDER — SODIUM CHLORIDE, SODIUM LACTATE, POTASSIUM CHLORIDE, CALCIUM CHLORIDE 600; 310; 30; 20 MG/100ML; MG/100ML; MG/100ML; MG/100ML
INJECTION, SOLUTION INTRAVENOUS CONTINUOUS PRN
Status: DISCONTINUED | OUTPATIENT
Start: 2017-06-08 | End: 2017-06-08

## 2017-06-08 RX ORDER — HYDROCODONE BITARTRATE AND ACETAMINOPHEN 5; 325 MG/1; MG/1
1 TABLET ORAL EVERY 4 HOURS PRN
Qty: 21 TABLET | Refills: 0 | Status: SHIPPED | OUTPATIENT
Start: 2017-06-08

## 2017-06-08 RX ORDER — CEFAZOLIN SODIUM 2 G/50ML
2 SOLUTION INTRAVENOUS
Status: DISCONTINUED | OUTPATIENT
Start: 2017-06-08 | End: 2017-06-08 | Stop reason: HOSPADM

## 2017-06-08 RX ORDER — MIDAZOLAM HYDROCHLORIDE 1 MG/ML
INJECTION, SOLUTION INTRAMUSCULAR; INTRAVENOUS
Status: DISCONTINUED | OUTPATIENT
Start: 2017-06-08 | End: 2017-06-08

## 2017-06-08 RX ORDER — PROPOFOL 10 MG/ML
VIAL (ML) INTRAVENOUS CONTINUOUS PRN
Status: DISCONTINUED | OUTPATIENT
Start: 2017-06-08 | End: 2017-06-08

## 2017-06-08 RX ORDER — LIDOCAINE HCL/PF 100 MG/5ML
SYRINGE (ML) INTRAVENOUS
Status: DISCONTINUED | OUTPATIENT
Start: 2017-06-08 | End: 2017-06-08

## 2017-06-08 RX ADMIN — PROPOFOL 2 MG: 10 INJECTION, EMULSION INTRAVENOUS at 08:06

## 2017-06-08 RX ADMIN — ROPIVACAINE HYDROCHLORIDE 20 ML: 5 INJECTION, SOLUTION EPIDURAL; INFILTRATION; PERINEURAL at 07:06

## 2017-06-08 RX ADMIN — LIDOCAINE HYDROCHLORIDE 50 MG: 20 INJECTION, SOLUTION INTRAVENOUS at 08:06

## 2017-06-08 RX ADMIN — PROPOFOL 75 MCG/KG/MIN: 10 INJECTION, EMULSION INTRAVENOUS at 08:06

## 2017-06-08 RX ADMIN — LIDOCAINE HYDROCHLORIDE AND EPINEPHRINE 10 ML: 20; 5 INJECTION, SOLUTION EPIDURAL; INFILTRATION; INTRACAUDAL; PERINEURAL at 07:06

## 2017-06-08 RX ADMIN — CEFAZOLIN SODIUM 2 G: 2 SOLUTION INTRAVENOUS at 08:06

## 2017-06-08 RX ADMIN — FENTANYL CITRATE 25 MCG: 50 INJECTION, SOLUTION INTRAMUSCULAR; INTRAVENOUS at 07:06

## 2017-06-08 RX ADMIN — SODIUM CHLORIDE, SODIUM LACTATE, POTASSIUM CHLORIDE, AND CALCIUM CHLORIDE: .6; .31; .03; .02 INJECTION, SOLUTION INTRAVENOUS at 08:06

## 2017-06-08 RX ADMIN — MIDAZOLAM 1 MG: 1 INJECTION INTRAMUSCULAR; INTRAVENOUS at 07:06

## 2017-06-08 NOTE — ANESTHESIA PROCEDURE NOTES
Peripheral    Patient location during procedure: pre-op    Reason for block: primary anesthetic   Diagnosis: Right elbow bursa   Start time: 6/8/2017 7:47 AM  Timeout: 6/8/2017 7:46 AM   End time: 6/8/2017 7:54 AM  Staffing  Anesthesiologist: SVETLANA MOYA  Performed: anesthesiologist   Preanesthetic Checklist  Completed: patient identified, site marked, surgical consent, pre-op evaluation, timeout performed, IV checked, risks and benefits discussed and monitors and equipment checked  Peripheral Block  Patient position: supine  Prep: ChloraPrep  Patient monitoring: heart rate, cardiac monitor, continuous pulse ox, continuous capnometry and frequent blood pressure checks  Block type: supraclavicular and intercostobrachial  Laterality: left  Injection technique: single shot  Needle  Needle type: StimupFlubit Limited   Needle gauge: 22 G  Needle length: 2 in  Needle localization: ultrasound guidance  Needle insertion depth: 4 cm   -ultrasound image captured on disc.  Assessment  Injection assessment: negative aspiration, positive parasthesia and local visualized surrounding nerve  Paresthesia pain: immediately resolved  Heart rate change: no  Slow fractionated injection: yes  Medications:  Bolus administered: 20 mL of 0.5 ropivacaine

## 2017-06-08 NOTE — PLAN OF CARE
"Will not tell me the last time he took certain medications because he doesn't know what he takes and says I'm "confusing him".  "

## 2017-06-08 NOTE — OP NOTE
DATE OF PROCEDURE:  06/08/2017    FACILITY:  [facility].    SURGEON:  Edgar Holland M.D.    FIRST ASSISTANT:  Kranthi Haywood M.D. (RES).    PREOPERATIVE DIAGNOSIS:  Left recurrent olecranon bursitis.    INDICATION:  To improve symptoms.    POSTOPERATIVE DIAGNOSIS:  Left recurrent olecranon bursitis.    PROCEDURE:  Excision of left olecranon bursa.    PROCEDURE IN DETAIL:  The patient was first correctly identified in the   preoperative holding area.  Written informed consent was obtained.  The correct   extremity was marked.  The patient was brought into the Operating Room after   receiving an interscalene block.  He was kept in the supine position on the   operating room table under general anesthesia.  The left elbow was prepped and   draped in the usual sterile fashion using DuraPrep solution.  A surgical   time-out was performed to verify the correct extremity and preoperative   administration of IV antibiotics within 1 hour of surgical start time.  The   extremity was exsanguinated with an Esmarch bandage and the tourniquet was   inflated to 300 mmHg.  A curvilinear incision was made over the olecranon.    Dissection with scissors was performed around the olecranon sac.  It was removed   in one piece.  Hemostasis was achieved.  The tourniquet was deflated.  The   wound was irrigated.  The wound was closed with interrupted buried 3-0 Monocryl   suture and interrupted 3-0 nylon vertical mattress sutures for the skin.  A   sterile dressing was applied.  A sling was applied.  The patient tolerated the   procedure without any known complication.    ESTIMATED BLOOD LOSS:  Less than 50 mL.    COMPLICATIONS:  None known.    DRAINS:  None.      TEJAL/  dd: 06/08/2017 09:31:40 (CDT)  td: 06/08/2017 11:14:00 (CDT)  Doc ID   #[id number]  Job ID #[id number]    CC:

## 2017-06-08 NOTE — BRIEF OP NOTE
Brief Operative Note    Patient:  Raymond Elizondo    Date: 6/8/2017    Pre-op Diagnosis: Left Elbow bursitis  Post-op Diagnosis: same    Procedure(s):  1. Left elbow olecranon bursectomy    Anesthesia: Regional    Surgeon(s):  KARRIE Haywood MD      Estimated Blood Loss: Minimal  Drain: none   Specimens: Left elbow bursa    Implants: none    Complications: none    Findings: Left elbow bursitis, inflamed bursa   Disposition: Taken to the recovery room in a stable condition, having suffered no apparent untoward event.  Condition: doing well without problems  Technique: Standard open left elbow bursectomy     I agree with findings outlined by the resident.

## 2017-06-08 NOTE — PLAN OF CARE
Problem: Patient Care Overview  Goal: Discharge Needs Assessment  Outcome: Outcome(s) achieved Date Met: 06/08/17  NO ISSUES

## 2017-06-08 NOTE — TRANSFER OF CARE
"Anesthesia Transfer of Care Note    Patient: Raymond Elizondo    Procedure(s) Performed: Procedure(s) (LRB):  BURSECTOMY - ELBOW (Left)    Patient location: OPS    Transport from OR: Transported from OR on room air with adequate spontaneous ventilation    Post pain: adequate analgesia    Post assessment: no apparent anesthetic complications    Post vital signs: stable    Level of consciousness: awake, alert and oriented    Nausea/Vomiting: no nausea/vomiting    Complications: none    Transfer of care protocol was followed      Last vitals:   Visit Vitals  /74   Pulse (!) 50   Temp 36.6 °C (97.9 °F) (Oral)   Resp 18   Ht 5' 9" (1.753 m)   Wt 92.5 kg (204 lb)   SpO2 (!) 93%   BMI 30.13 kg/m²     "

## 2017-06-08 NOTE — DISCHARGE SUMMARY
Physician Discharge Summary     Patient ID:[id number]      Admit date: 6/8/2017    Discharge date: 6/8/2017    Admitting Physician: KARRIE Holland    Discharge Physician: KARRIE Holland    Admission Diagnoses: Left Elbow bursitis    Discharge Diagnoses: Left Elbow bursitis    Admission Condition: good    Discharged Condition: good    Indication for Admission: Pt discharged home    Hospital Course: Patient presented to [facility] on day of scheduled surgery and underwent left elbow olecranon bursectomy, please see operative report for further detail. Patient did well postoperatively and was found to be fit for discharge on POD# 0. Discharged home.      Consults: none    Significant Diagnostic Studies: none    Treatments: Left elbow olecranon bursectomy    Discharge Exam:  AAOx3 in NAD  Pulm: no increased WOB, equal chest expansion  CV: WNL by RP  CARMEL:  Clean soft dressings and sling to the left upper extremity  Full motor and sensation to the left upper extremity  Extremity WWP    Disposition: Home or Self Care    Patient Instructions:     Discharge Medications  Refer to Discharge Medication List    Activity: activity as tolerated  Diet: regular diet  Wound Care: keep wound clean and dry, may remove in 72 hours    Discussed plan with patient and answered questions: Yes        Signed:  Kranthi Haywood MD    I certify the need for these services furnished under this plan of treatment and while under my care.  Edgar Holland MD

## 2017-06-08 NOTE — PLAN OF CARE
0747 procedure started after time out, vss, monitors x 4, site prepped, 0753 negative aspiration and test dose, vss, patient will go to OR

## 2017-06-08 NOTE — DISCHARGE INSTRUCTIONS
DRESSING CARE AND ACTIVITY  - KEEP DRESSING CLEAN, DRY AND INTACT UNTIL FOLLOW UP VISIT, COVER DRESSING IN GARBAGE BAG WHEN BATHING OR SHOWERING  -KEEP ARM IN SLING UNTIL YOU HAVE FULL FEELING AND CONTROL IN YOUR ARM  -ACTIVITY AS TOLERATED, ONCE YOU HAVE CONTROL OF YOUR ARM    Anesthesia: Monitored Anesthesia Care (MAC)  Youre due to have surgery. During surgery, youll be given medication called anesthesia. This will keep you comfortable and pain-free. Your surgeon will use monitored anesthesia care (MAC). This sheet tells you more about this type of anesthesia.    What is monitored anesthesia care?  MAC keeps you very drowsy during surgery. You may be awake, but you will likely not remember much. And you wont feel pain. With MAC, medications are given through an IV line into a vein in your arm or hand. A local anesthetic will usually be injected into the skin and muscle around the surgical site to numb it. The anesthesia provider monitors you during the procedure. He or she checks your heart rate and rhythm, blood pressure, and blood oxygen level.  Anesthesia tools and medications that may be near you during your procedure  You will likely have:  · A pulse oximeter on the end of your finger. This measures your blood oxygen level.  · Electrocardiography leads (electrodes) on your chest. These record your heart rate and rhythm.  · Medications given through an IV. These relax you and prevent pain. You may be awake or sleep lightly. If you have local anesthetic, it is injected directly into your skin.  · A facemask to give you oxygen, if needed.  Risks and Possible Complications  MAC has some risks. These include:  · Breathing problems  · Nausea and vomiting  · Allergic reaction to the anesthetic    Anesthesia safety  · Follow all instructions you are given for how long not to eat or drink before your procedure.  · Be sure your doctor knows what medications you take, especially any anti-inflammatory medication or  blood thinners. This includes aspirin and any other over-the-counter medications, herbs, and supplements.  · Have an adult family member or friend drive you home after the procedure.  · For the first 24 hours after your surgery:  ¨ Do not drive or use heavy equipment.  ¨ Do not make important decisions or sign documents.  ¨ Avoid alcohol.  ¨ Have someone stay with you, if possible. They can watch for problems and help keep you safe.  Date Last Reviewed: 10/16/2014  © 8517-6502 [employer]. [address] [place] [id number]. All rights reserved. This information is not intended as a substitute for professional medical care. Always follow your healthcare professional's instructions.      Acetaminophen; Hydrocodone tablets or capsules  What is this medicine?  ACETAMINOPHEN; HYDROCODONE (a set a GILBERTO roberta fen; geovanna droe KOE done) is a pain reliever. It is used to treat moderate to severe pain.  How should I use this medicine?  Take this medicine by mouth with a glass of water. Follow the directions on the prescription label. You can take it with or without food. If it upsets your stomach, take it with food. Do not take your medicine more often than directed.  A special MedGuide will be given to you by the pharmacist with each prescription and refill. Be sure to read this information carefully each time.  Talk to your pediatrician regarding the use of this medicine in children. Special care may be needed.  What side effects may I notice from receiving this medicine?  Side effects that you should report to your doctor or health care professional as soon as possible:  · allergic reactions like skin rash, itching or hives, swelling of the face, lips, or tongue  · breathing problems  · confusion  · redness, blistering, peeling or loosening of the skin, including inside the mouth  · signs and symptoms of low blood pressure like dizziness; feeling faint or lightheaded, falls; unusually weak or  tired  · trouble passing urine or change in the amount of urine  · yellowing of the eyes or skin  Side effects that usually do not require medical attention (report to your doctor or health care professional if they continue or are bothersome):  · constipation  · dry mouth  · nausea, vomiting  · tiredness  What may interact with this medicine?  This medicine may interact with the following medications:  · alcohol  · antiviral medicines for HIV or AIDS  · atropine  · antihistamines for allergy, cough and cold  · certain antibiotics like erythromycin, clarithromycin  · certain medicines for anxiety or sleep  · certain medicines for bladder problems like oxybutynin, tolterodine  · certain medicines for depression like amitriptyline, fluoxetine, sertraline  · certain medicines for fungal infections like ketoconazole and itraconazole  · certain medicines for Parkinson's disease like benztropine, trihexyphenidyl  · certain medicines for seizures like carbamazepine, phenobarbital, phenytoin, primidone  · certain medicines for stomach problems like dicyclomine, hyoscyamine  · certain medicines for travel sickness like scopolamine  · general anesthetics like halothane, isoflurane, methoxyflurane, propofol  · ipratropium  · local anesthetics like lidocaine, pramoxine, tetracaine  · MAOIs like Carbex, Eldepryl, Marplan, Nardil, and Parnate  · medicines that relax muscles for surgery  · other medicines with acetaminophen  · other narcotic medicines for pain or cough  · phenothiazines like chlorpromazine, mesoridazine, prochlorperazine, thioridazine  · rifampin  What if I miss a dose?  If you miss a dose, take it as soon as you can. If it is almost time for your next dose, take only that dose. Do not take double or extra doses.  Where should I keep my medicine?  Keep out of the reach of children. This medicine can be abused. Keep your medicine in a safe place to protect it from theft. Do not share this medicine with anyone.  Selling or giving away this medicine is dangerous and against the law.  This medicine may cause accidental overdose and death if it taken by other adults, children, or pets. Mix any unused medicine with a substance like cat litter or coffee grounds. Then throw the medicine away in a sealed container like a sealed bag or a coffee can with a lid. Do not use the medicine after the expiration date.  Store at room temperature between 15 and 30 degrees C (59 and 86 degrees F).  What should I tell my health care provider before I take this medicine?  They need to know if you have any of these conditions:  · brain tumor  · Crohn's disease, inflammatory bowel disease, or ulcerative colitis  · drug abuse or addiction  · head injury  · heart or circulation problems  · if you often drink alcohol  · kidney disease or problems going to the bathroom  · liver disease  · lung disease, asthma, or breathing problems  · an unusual or allergic reaction to acetaminophen, hydrocodone, other opioid analgesics, other medicines, foods, dyes, or preservatives  · pregnant or trying to get pregnant  · breast-feeding  What should I watch for while using this medicine?  Tell your doctor or health care professional if your pain does not go away, if it gets worse, or if you have new or a different type of pain. You may develop tolerance to the medicine. Tolerance means that you will need a higher dose of the medicine for pain relief. Tolerance is normal and is expected if you take the medicine for a long time.  Do not suddenly stop taking your medicine because you may develop a severe reaction. Your body becomes used to the medicine. This does NOT mean you are addicted. Addiction is a behavior related to getting and using a drug for a non-medical reason. If you have pain, you have a medical reason to take pain medicine. Your doctor will tell you how much medicine to take. If your doctor wants you to stop the medicine, the dose will be slowly  lowered over time to avoid any side effects.  There are different types of narcotic medicines (opiates). If you take more than one type at the same time or if you are taking another medicine that also causes drowsiness, you may have more side effects. Give your health care provider a list of all medicines you use. Your doctor will tell you how much medicine to take. Do not take more medicine than directed. Call emergency for help if you have problems breathing or unusual sleepiness.  Do not take other medicines that contain acetaminophen with this medicine. Always read labels carefully. If you have questions, ask your doctor or pharmacist.  If you take too much acetaminophen get medical help right away. Too much acetaminophen can be very dangerous and cause liver damage. Even if you do not have symptoms, it is important to get help right away.  You may get drowsy or dizzy. Do not drive, use machinery, or do anything that needs mental alertness until you know how this medicine affects you. Do not stand or sit up quickly, especially if you are an older patient. This reduces the risk of dizzy or fainting spells. Alcohol may interfere with the effect of this medicine. Avoid alcoholic drinks.  The medicine will cause constipation. Try to have a bowel movement at least every 2 to 3 days. If you do not have a bowel movement for 3 days, call your doctor or health care professional.  Your mouth may get dry. Chewing sugarless gum or sucking hard candy, and drinking plenty of water may help. Contact your doctor if the problem does not go away or is severe.  Date Last Reviewed:   NOTE:This sheet is a summary. It may not cover all possible information. If you have questions about this medicine, talk to your doctor, pharmacist, or health care provider. Copyright© 2016 [employer]

## 2017-06-08 NOTE — PLAN OF CARE
Problem: Patient Care Overview  Goal: Plan of Care Review  Outcome: Outcome(s) achieved Date Met: 06/08/17  POC AND DISCHARGE DISCUSSED, VSS, NO ISSUES

## 2017-06-08 NOTE — ANESTHESIA POSTPROCEDURE EVALUATION
"Anesthesia Post Evaluation    Patient: Raymond Elizondo    Procedure(s) Performed: Procedure(s) (LRB):  BURSECTOMY - ELBOW (Left)    Final Anesthesia Type: regional  Patient location during evaluation: OPS  Patient participation: Yes- Able to Participate  Level of consciousness: awake and alert  Post-procedure vital signs: reviewed and stable  Pain management: adequate  Airway patency: patent  PONV status at discharge: No PONV  Anesthetic complications: no      Cardiovascular status: blood pressure returned to baseline  Respiratory status: unassisted and spontaneous ventilation  Hydration status: euvolemic  Follow-up not needed.        Visit Vitals  /74   Pulse (!) 50   Temp 36.6 °C (97.9 °F) (Oral)   Resp 18   Ht 5' 9" (1.753 m)   Wt 92.5 kg (204 lb)   SpO2 (!) 93%   BMI 30.13 kg/m²       Pain/Bennie Score: Pain Assessment Performed: Yes (6/8/2017  7:13 AM)  Presence of Pain: denies (6/8/2017  7:13 AM)  Bennie Score: 10 (6/8/2017  7:13 AM)      "

## 2017-06-08 NOTE — PLAN OF CARE
Problem: Patient Care Overview  Goal: Individualization & Mutuality  Outcome: Outcome(s) achieved Date Met: 06/08/17  NO ISSUES

## 2017-06-08 NOTE — ANESTHESIA PREPROCEDURE EVALUATION
06/08/2017     Raymond Elizondo is a 77 y.o., male  Is scheduled for excision of left olecranon bursae under reg/gen on 2/02/2017.    Past Surgical History:   Procedure Laterality Date    APPENDECTOMY      CHOLECYSTECTOMY  2001    ELBOW FRACTURE SURGERY Right          ESOPHAGEAL RECONSTRUCTION  2011    KNEE ARTHROSCOPY W/ MENISCAL REPAIR Right          PROSTATE BIOPSY  2016    SKIN CANCER EXCISION      Left ear, Left flank, Right flank    TONSILLECTOMY           Anesthesia Evaluation    I have reviewed the Patient Summary Reports.    I have reviewed the Nursing Notes.   I have reviewed the Medications.     Review of Systems  Anesthesia Hx:  No problems with previous Anesthesia  History of prior surgery of interest to airway management or planning: esophageal. Previous anesthesia: General, MAC Denies Family Hx of Anesthesia complications.   Denies Personal Hx of Anesthesia complications.   Social:  Former Smoker, No Alcohol Use Former ETOH abuse; stopped 1999   Hematology/Oncology:         -- Denies Anemia: -- Coag Disorders: Bleeding Disorder (pt states is prone to bleeding; PT chronically mildly elevated; does not recall if diagnosed with any specific disorder):   --  Cancer in past history (Esophogeal CA; prostate CA and mult skin cancers):  chemotherapy    EENT/Dental:EENT/Dental Normal   Cardiovascular:   Exercise tolerance: good Hypertension, well controlled Past MI CAD  Dysrhythmias (not on anticoagulation due to easy bleeding per pt) atrial fibrillation Angina (Rare chest pain and takes nitro tab; reports takes approx 1-2 x per month ), at rest Orthopnea (sleeps on wedge or recliner) hyperlipidemia CHRIS (home O2 uses with moderate activity) Patient sees Dr. Castelan. Denies any CP in recent months. Goes to gym every Monday and goes on treadmill 15-30 minutes;    Pulmonary:   COPD (home O2  use with walking long distances), severe Shortness of breath Sleep Apnea, CPAP    Renal/:   BPH (hx of prostate cancer)    Hepatic/GI:   Liver Disease, Hx of Pancreatitis and EtOH cirrohsis; denies abd pain; no nausea/vomiting   Musculoskeletal:   Arthritis  Bursae visible left elbow; denies pain and no change in ROM   Neurological:  Neurology Normal    Endocrine:   Diabetes, well controlled, type 2  Diabetes, Type 2 Diabetes , controlled by oral hypoglycemics. , most recent HgA1c value was 5.2 on 2/2016.    Dermatological:   Hx of Skin CA multiple sites   Psych:   anxiety          SpO2 Readings from Last 3 Encounters:   01/27/17 95%   11/04/16 97%   11/02/16 (!) 92%       Physical Exam  General:  Obesity    Airway/Jaw/Neck:  Airway Findings: Mouth Opening: Normal Tongue: Normal  Mallampati: II  TM Distance: Normal, at least 6 cm  Jaw/Neck Findings:  Neck ROM: Normal ROM  Neck Findings: Normal    Eyes/Ears/Nose:  EYES/EARS/NOSE FINDINGS: Normal   Dental:  endentulous   Chest/Lungs:  Chest/Lungs Findings: (decreased bilateral bases) Clear to auscultation, Decreased Breath Sounds Bilateral     Heart/Vascular:  Heart Findings: Rate: Bradycardia  Rhythm: Irregularly Irregular  Sounds: Normal  Heart murmur: negative    Abdomen:  Abdomen Findings: Normal    Musculoskeletal:  Musculoskeletal Findings: (enlarged bursae, left elbow) Swollen Joint     Mental Status:  Mental Status Findings: Normal      Lab Results   Component Value Date    WBC 5.44 06/08/2017    HGB 13.2 (L) 06/08/2017    HCT 38.3 (L) 06/08/2017    MCV 88 06/08/2017     06/08/2017       Chemistry        Component Value Date/Time     06/08/2017 0700    K 3.4 (L) 06/08/2017 0700     06/08/2017 0700    CO2 24 06/08/2017 0700    BUN 29 (H) 06/08/2017 0700    CREATININE 1.6 (H) 06/08/2017 0700     (H) 06/08/2017 0700        Component Value Date/Time    CALCIUM 10.8 (H) 06/08/2017 0700    ALKPHOS 35 (L) 11/04/2016 1546    AST 24  11/04/2016 1546    ALT 15 11/04/2016 1546    BILITOT 1.4 (H) 11/04/2016 1546              Anesthesia Plan  Type of Anesthesia, risks & benefits discussed:  Anesthesia Type:  general, regional  Patient's Preference:   Intra-op Monitoring Plan:   Intra-op Monitoring Plan Comments:   Post Op Pain Control Plan:   Post Op Pain Control Plan Comments:   Induction:   IV  Beta Blocker:         Informed Consent: Patient understands risks and agrees with Anesthesia plan.  Questions answered. Anesthesia consent signed with patient.  ASA Score: 4     Day of Surgery Review of History & Physical:        Anesthesia Plan Notes:           Ready For Surgery From Anesthesia Perspective.

## 2017-06-09 ENCOUNTER — PATIENT MESSAGE (OUTPATIENT)
Dept: UROLOGY | Facility: CLINIC | Age: 77
End: 2017-06-09

## 2017-06-09 ENCOUNTER — LAB VISIT (OUTPATIENT)
Dept: LAB | Facility: HOSPITAL | Age: 77
End: 2017-06-09
Attending: NURSE PRACTITIONER
Payer: MEDICARE

## 2017-06-09 VITALS
TEMPERATURE: 98 F | SYSTOLIC BLOOD PRESSURE: 115 MMHG | WEIGHT: 204 LBS | HEART RATE: 50 BPM | RESPIRATION RATE: 18 BRPM | OXYGEN SATURATION: 93 % | DIASTOLIC BLOOD PRESSURE: 74 MMHG | BODY MASS INDEX: 30.21 KG/M2 | HEIGHT: 69 IN

## 2017-06-09 DIAGNOSIS — R30.0 DYSURIA: ICD-10-CM

## 2017-06-09 DIAGNOSIS — R30.0 DYSURIA: Primary | ICD-10-CM

## 2017-06-09 PROCEDURE — 87086 URINE CULTURE/COLONY COUNT: CPT

## 2017-06-09 NOTE — ANESTHESIA POSTPROCEDURE EVALUATION
"Anesthesia Post Evaluation    Patient: Raymond Elizondo    Procedure(s) Performed: Procedure(s) (LRB):  BURSECTOMY - ELBOW (Left)              Comments: Phoned patient:  Appreciated nerve block (lasted until 8PM) and complemented Dr. Lacy as anesthesiologist        Visit Vitals  /74   Pulse (!) 50   Temp 36.6 °C (97.9 °F) (Oral)   Resp 18   Ht 5' 9" (1.753 m)   Wt 92.5 kg (204 lb)   SpO2 (!) 93%   BMI 30.13 kg/m²       Pain/Bennie Score: Pain Assessment Performed: Yes (6/8/2017 10:40 AM)  Presence of Pain: denies (6/8/2017 10:40 AM)  Pain Rating Post Med Admin: 1 (6/8/2017 10:40 AM)  Bennie Score: 10 (6/8/2017 10:40 AM)  Modified Bennie Score: 20 (6/8/2017 10:40 AM)      "

## 2017-06-10 LAB — BACTERIA UR CULT: NO GROWTH

## 2017-06-14 ENCOUNTER — OFFICE VISIT (OUTPATIENT)
Dept: UROLOGY | Facility: CLINIC | Age: 77
End: 2017-06-14
Payer: MEDICARE

## 2017-06-14 VITALS
WEIGHT: 209.44 LBS | HEART RATE: 49 BPM | HEIGHT: 69 IN | DIASTOLIC BLOOD PRESSURE: 63 MMHG | SYSTOLIC BLOOD PRESSURE: 126 MMHG | BODY MASS INDEX: 31.02 KG/M2

## 2017-06-14 DIAGNOSIS — N31.9 NEUROGENIC BLADDER: ICD-10-CM

## 2017-06-14 DIAGNOSIS — N39.0 RECURRENT UTI: ICD-10-CM

## 2017-06-14 DIAGNOSIS — N35.9 URETHRAL STRICTURE, UNSPECIFIED STRICTURE TYPE: ICD-10-CM

## 2017-06-14 DIAGNOSIS — R33.9 URINARY RETENTION: Primary | ICD-10-CM

## 2017-06-14 PROCEDURE — 99024 POSTOP FOLLOW-UP VISIT: CPT | Mod: S$GLB,,, | Performed by: NURSE PRACTITIONER

## 2017-06-14 PROCEDURE — 99999 PR PBB SHADOW E&M-EST. PATIENT-LVL IV: CPT | Mod: PBBFAC,,, | Performed by: NURSE PRACTITIONER

## 2017-06-14 RX ORDER — ECONAZOLE NITRATE 10 MG/G
CREAM TOPICAL
Refills: 10 | COMMUNITY
Start: 2017-04-25

## 2017-06-14 NOTE — PROGRESS NOTES
CHIEF COMPLAINT:    Mr. Elizondo is a 77 y.o. male presenting for 3 month visit.     PRESENTING ILLNESS:    Raymond Elizondo is a 77 y.o. male with a PMH of prostate cancer and urethral stricture who presents for 3 month follow up   He was last seen with Dr. Hemphill on 11/4/16 for a post op visit.     s/p cysto and urethral dilation for meatal stenosis, membranous urethral stricture, and CO2 laser and excisional bx of multiple penile lesions on 11/2/16 with Dr. Hemphill. On 11/4/16, leyva catheter was pulled but pt returned to the ED for urinary retention. Leyva reinserted at ED.   Prior to procedures, he had c/o hesitancy, difficult voiding, and dribbling.     s/p brachytherapy combined with external beam radiation therapy for prostate cancer at  10 years ago.  Hx of urinary retention with blood clots.     Today, he reports he is doing well.   He performs CIC q 5 days without difficulties.  Reports strong FOS and complete bladder emptying.   Last week he thought he had a UTi because he had foul smelling urine and dysuria. UC was negative. He denies the symptoms today.  Denies hematuria, dysuria, abdominal pain, flank pain, f/c, and n/v.   Takes flomax daily.     Date: 03/01/2017     Pre-Op Diagnosis: urethral stricture     Post-Op Diagnosis: same     Procedure(s) Performed:   1. Cystoscopy with DVIU  2. Fulguration  3. Fluoro < 1 hour     Specimen(s): none     Staff Surgeon: Connor Hemphill MD      Indications: Raymond Elizondo is a 77 y.o. male with urethral stricture presenting for endoscopic management.      Findings: tight stricture in bulbar urethra      REVIEW OF SYSTEMS:    Review of Systems    Constitutional: Negative for fever and chills.   HENT: Negative for hearing loss.   Eyes: Negative for visual disturbance.   Respiratory: Negative for shortness of breath.   Cardiovascular: Negative for chest pain.   Gastrointestinal: Negative for nausea, vomiting, and constipation.   Genitourinary:  See  above  Neurological: Negative for dizziness.   Hematological: Does not bruise/bleed easily.   Psychiatric/Behavioral: Negative for confusion.       PATIENT HISTORY:    Past Medical History:   Diagnosis Date    Cirrhosis     COPD (chronic obstructive pulmonary disease)     Coronary artery disease     Diabetes mellitus     Esophageal cancer     Hypertension     Irregular heart beat     Pancreatitis     3x    Prostate cancer     Skin cancer     multiple sites    Urinary tract infection        Past Surgical History:   Procedure Laterality Date    APPENDECTOMY      CHOLECYSTECTOMY  2001    ELBOW FRACTURE SURGERY Right          ESOPHAGEAL RECONSTRUCTION  2011    KNEE ARTHROSCOPY W/ MENISCAL REPAIR Right          PROSTATE BIOPSY  2016    SKIN CANCER EXCISION      Left ear, Left flank, Right flank    TONSILLECTOMY         Family History   Problem Relation Age of Onset    Diabetes Mother     Cancer Mother     Cancer Father     Prostate cancer Neg Hx     Kidney disease Neg Hx        Social History     Social History    Marital status:      Spouse name: N/A    Number of children: N/A    Years of education: N/A     Occupational History    Not on file.     Social History Main Topics    Smoking status: Former Smoker     Packs/day: 0.25     Years: 0.50    Smokeless tobacco: Not on file    Alcohol use No    Drug use: No    Sexual activity: Not Currently     Other Topics Concern    Not on file     Social History Narrative    No narrative on file       Allergies:  Aspirin and Ciprofloxacin    Medications:    Current Outpatient Prescriptions:     albuterol (PROVENTIL) 5 mg/mL nebulizer solution, Take 2.5 mg by nebulization every 6 (six) hours as needed., Disp: , Rfl:     alprazolam (XANAX) 0.25 MG tablet, Take 0.25 mg by mouth 3 (three) times daily as needed for Insomnia or Anxiety. Takes every morning and prn, Disp: , Rfl:     amlodipine (NORVASC) 10 MG tablet, Take 10 mg by mouth every  morning. , Disp: , Rfl:     atenolol (TENORMIN) 25 MG tablet, Take 25 mg by mouth every morning. , Disp: , Rfl:     BREO ELLIPTA 100-25 mcg/dose diskus inhaler, Inhale 1 puff into the lungs every morning. , Disp: , Rfl:     bumetanide (BUMEX) 1 MG tablet, Take 1 mg by mouth every morning., Disp: , Rfl:     diphenoxylate-atropine 2.5-0.025 mg (LOMOTIL) 2.5-0.025 mg per tablet, Take 2 tablets by mouth every 6 (six) hours as needed., Disp: , Rfl: 5    econazole nitrate 1 % cream, APPLY TO THE NAILS DAILY. FILE NAILS ONCE A WEEK., Disp: , Rfl: 10    fenofibrate 160 MG Tab, Take 160 mg by mouth every morning. , Disp: , Rfl:     glipiZIDE (GLUCOTROL) 5 MG tablet, Take 2.5 mg by mouth daily with breakfast. Takes half of a 5 mg tablet every morning, Disp: , Rfl:     lorazepam (ATIVAN) 1 MG tablet, Take 1 mg by mouth every 6 (six) hours as needed for Anxiety. Takes two 1 mg tablets every morning and takes 1 mg every bedtime, Disp: , Rfl:     nitroGLYCERIN (NITROSTAT) 0.4 MG SL tablet, Place 0.4 mg under the tongue every 5 (five) minutes as needed for Chest pain., Disp: , Rfl:     omeprazole (PRILOSEC) 20 MG capsule, Take 20 mg by mouth every morning. , Disp: , Rfl:     potassium chloride (MICRO-K) 10 MEQ CpSR, Take 10 mEq by mouth every morning. , Disp: , Rfl:     potassium chloride SA (K-DUR,KLOR-CON) 10 MEQ tablet, Take 10 mEq by mouth 2 (two) times daily., Disp: , Rfl: 1    pravastatin (PRAVACHOL) 40 MG tablet, Take 40 mg by mouth every morning. , Disp: , Rfl:     tamsulosin (FLOMAX) 0.4 mg Cp24, Take 1 capsule (0.4 mg total) by mouth every evening., Disp: 90 capsule, Rfl: 3    trazodone (DESYREL) 150 MG tablet, Take 150 mg by mouth every evening., Disp: , Rfl: 5    VENTOLIN HFA 90 mcg/actuation inhaler, Inhale 2 puffs into the lungs every 6 (six) hours as needed. , Disp: , Rfl:     hydrocodone-acetaminophen 5-325mg (NORCO) 5-325 mg per tablet, Take 1 tablet by mouth every 4 (four) hours as needed for  Pain., Disp: 21 tablet, Rfl: 0    polyethylene glycol (GLYCOLAX) 17 gram PwPk, Take 17 g by mouth once daily., Disp: 30 packet, Rfl: 0    PHYSICAL EXAMINATION:    Constitutional: He is oriented to person, place, and time. He appears well-developed and well-nourished.  He is in no apparent distress.    Neck: No tracheal deviation present.     Cardiovascular: Normal rate.      Pulmonary/Chest: Effort normal. No respiratory distress.     Abdominal:  He exhibits no distension.     Neurological: He is alert and oriented to person, place, and time.     Skin: Skin is warm and dry.     Psych: Cooperative with normal affect.      Physical Exam      LABS:    Lab Results   Component Value Date    PSADIAG <0.01 06/27/2016       IMPRESSION:    Encounter Diagnoses   Name Primary?    Urinary retention Yes    Urethral stricture, unspecified stricture type     Recurrent UTI     Neurogenic bladder        PLAN:  -Reassured pt,  -Discussed CIC. Educated him on CIC. He demonstrated with no difficulties.   -CIC every 5 days or PRN indefinitely with 14 fr straight tip catheter for urinary retention.   -RTC in 6 months or prn   -Patient voiced understanding  -I encouraged him or any of his family members to call or email me with questions and/or concerns.    GLENDY Ashley

## 2017-08-07 ENCOUNTER — PATIENT MESSAGE (OUTPATIENT)
Dept: UROLOGY | Facility: CLINIC | Age: 77
End: 2017-08-07

## 2017-09-07 ENCOUNTER — CLINICAL SUPPORT (OUTPATIENT)
Dept: SMOKING CESSATION | Facility: CLINIC | Age: 77
End: 2017-09-07
Payer: COMMERCIAL

## 2017-09-07 DIAGNOSIS — F17.200 NICOTINE DEPENDENCE: Primary | ICD-10-CM

## 2017-09-07 PROCEDURE — 99407 BEHAV CHNG SMOKING > 10 MIN: CPT | Mod: S$GLB,,,

## 2017-09-13 ENCOUNTER — PATIENT MESSAGE (OUTPATIENT)
Dept: UROLOGY | Facility: CLINIC | Age: 77
End: 2017-09-13

## 2017-10-04 ENCOUNTER — OFFICE VISIT (OUTPATIENT)
Dept: UROLOGY | Facility: CLINIC | Age: 77
End: 2017-10-04
Payer: MEDICARE

## 2017-10-04 ENCOUNTER — TELEPHONE (OUTPATIENT)
Dept: UROLOGY | Facility: CLINIC | Age: 77
End: 2017-10-04

## 2017-10-04 DIAGNOSIS — N35.9 URETHRAL STRICTURE, UNSPECIFIED STRICTURE TYPE: Primary | ICD-10-CM

## 2017-10-04 PROCEDURE — 99999 PR PBB SHADOW E&M-EST. PATIENT-LVL III: CPT | Mod: PBBFAC,,, | Performed by: PHYSICIAN ASSISTANT

## 2017-10-04 PROCEDURE — 99213 OFFICE O/P EST LOW 20 MIN: CPT | Mod: PBBFAC | Performed by: PHYSICIAN ASSISTANT

## 2017-10-04 PROCEDURE — 99213 OFFICE O/P EST LOW 20 MIN: CPT | Mod: S$PBB,,, | Performed by: PHYSICIAN ASSISTANT

## 2017-10-04 NOTE — PROGRESS NOTES
CHIEF COMPLAINT:    Mr. Elizondo is a 77 y.o. male presenting for difficulty passing a catheter.    PRESENTING ILLNESS:    Raymond Elizondo is a 77 y.o. male who presents for difficulty passing a catheter.  He reports inability to completely pass a catheter for a week.  He has tried to do CIC with a 14fr but has had multiple unsuccessful attempts.  He usually does CIC once a week to keep his urethral stricture open.  He does not have any urinary complaints.  He feels as if he empties his bladder completely and denies difficulty urinating.      He is s/p cysto and urethral dilation for meatal stenosis, membranous urethral stricture, and CO2 laser and excisional bx of multiple penile lesions on 11/2/16 with Dr. Hemphill. On 11/4/16, leyva catheter was pulled but pt returned to the ED for urinary retention. Leyva reinserted at ED.   Prior to procedures, he had c/o hesitancy, difficult voiding, and dribbling.     s/p brachytherapy combined with external beam radiation therapy for prostate cancer at  10 years ago.  Hx of urinary retention with blood clots.    REVIEW OF SYSTEMS:    Constitutional: Negative for fever and chills.   HENT: Negative for hearing loss.   Eyes: Negative for visual disturbance.   Respiratory: Negative for shortness of breath.   Cardiovascular: Negative for chest pain.   Gastrointestinal: Negative for nausea, vomiting, and constipation.   Genitourinary:  See HPI  Neurological: Negative for dizziness.   Hematological: Does not bruise/bleed easily.   Psychiatric/Behavioral: Negative for confusion.       PATIENT HISTORY:    Past Medical History:   Diagnosis Date    Cirrhosis     COPD (chronic obstructive pulmonary disease)     Coronary artery disease     Diabetes mellitus     Esophageal cancer     Hypertension     Irregular heart beat     Pancreatitis     3x    Prostate cancer     Skin cancer     multiple sites    Urinary tract infection        Past Surgical History:   Procedure Laterality  Date    APPENDECTOMY      CHOLECYSTECTOMY  2001    ELBOW FRACTURE SURGERY Right          ESOPHAGEAL RECONSTRUCTION  2011    KNEE ARTHROSCOPY W/ MENISCAL REPAIR Right          PROSTATE BIOPSY  2016    SKIN CANCER EXCISION      Left ear, Left flank, Right flank    TONSILLECTOMY         Family History   Problem Relation Age of Onset    Diabetes Mother     Cancer Mother     Cancer Father     Prostate cancer Neg Hx     Kidney disease Neg Hx        Social History     Social History    Marital status:      Spouse name: N/A    Number of children: N/A    Years of education: N/A     Occupational History    Not on file.     Social History Main Topics    Smoking status: Former Smoker     Packs/day: 0.25     Years: 0.50    Smokeless tobacco: Never Used    Alcohol use No    Drug use: No    Sexual activity: Not Currently     Other Topics Concern    Not on file     Social History Narrative    No narrative on file       Allergies:  Aspirin and Ciprofloxacin    Medications:    Current Outpatient Prescriptions:     albuterol (PROVENTIL) 5 mg/mL nebulizer solution, Take 2.5 mg by nebulization every 6 (six) hours as needed., Disp: , Rfl:     alprazolam (XANAX) 0.25 MG tablet, Take 0.25 mg by mouth 3 (three) times daily as needed for Insomnia or Anxiety. Takes every morning and prn, Disp: , Rfl:     amlodipine (NORVASC) 10 MG tablet, Take 10 mg by mouth every morning. , Disp: , Rfl:     atenolol (TENORMIN) 25 MG tablet, Take 25 mg by mouth every morning. , Disp: , Rfl:     BREO ELLIPTA 100-25 mcg/dose diskus inhaler, Inhale 1 puff into the lungs every morning. , Disp: , Rfl:     bumetanide (BUMEX) 1 MG tablet, Take 1 mg by mouth every morning., Disp: , Rfl:     diphenoxylate-atropine 2.5-0.025 mg (LOMOTIL) 2.5-0.025 mg per tablet, Take 2 tablets by mouth every 6 (six) hours as needed., Disp: , Rfl: 5    econazole nitrate 1 % cream, APPLY TO THE NAILS DAILY. FILE NAILS ONCE A WEEK., Disp: , Rfl:  10    fenofibrate 160 MG Tab, Take 160 mg by mouth every morning. , Disp: , Rfl:     glipiZIDE (GLUCOTROL) 5 MG tablet, Take 2.5 mg by mouth daily with breakfast. Takes half of a 5 mg tablet every morning, Disp: , Rfl:     hydrocodone-acetaminophen 5-325mg (NORCO) 5-325 mg per tablet, Take 1 tablet by mouth every 4 (four) hours as needed for Pain., Disp: 21 tablet, Rfl: 0    lorazepam (ATIVAN) 1 MG tablet, Take 1 mg by mouth every 6 (six) hours as needed for Anxiety. Takes two 1 mg tablets every morning and takes 1 mg every bedtime, Disp: , Rfl:     nitroGLYCERIN (NITROSTAT) 0.4 MG SL tablet, Place 0.4 mg under the tongue every 5 (five) minutes as needed for Chest pain., Disp: , Rfl:     omeprazole (PRILOSEC) 20 MG capsule, Take 20 mg by mouth every morning. , Disp: , Rfl:     polyethylene glycol (GLYCOLAX) 17 gram PwPk, Take 17 g by mouth once daily., Disp: 30 packet, Rfl: 0    potassium chloride (MICRO-K) 10 MEQ CpSR, Take 10 mEq by mouth every morning. , Disp: , Rfl:     potassium chloride SA (K-DUR,KLOR-CON) 10 MEQ tablet, Take 10 mEq by mouth 2 (two) times daily., Disp: , Rfl: 1    pravastatin (PRAVACHOL) 40 MG tablet, Take 40 mg by mouth every morning. , Disp: , Rfl:     tamsulosin (FLOMAX) 0.4 mg Cp24, Take 1 capsule (0.4 mg total) by mouth every evening., Disp: 90 capsule, Rfl: 3    trazodone (DESYREL) 150 MG tablet, Take 150 mg by mouth every evening., Disp: , Rfl: 5    VENTOLIN HFA 90 mcg/actuation inhaler, Inhale 2 puffs into the lungs every 6 (six) hours as needed. , Disp: , Rfl:     PHYSICAL EXAMINATION:    Constitutional: He appears well-developed and well-nourished.  He is in no apparent distress.    Eyes: No scleral icterus noted bilaterally.  No discharge noted bilaterally.      Cardiovascular: Normal rate.  No pitting edema noted in lower extremities bilaterally    Pulmonary/Chest: Effort normal. No respiratory distress.     Abdominal:  He exhibits no distension.      Neurological: He  is alert and oriented to person, place, and time.     Skin: Skin is warm and dry.     Psych: Cooperative with normal affect.    Mouth: dentition is normal.      Physical Exam    Bladder scan performed by Nurse Baylee.  PVR 130ml  LABS:    U/a: 1.015, pH 5, negative.    Lab Results   Component Value Date    PSADIAG <0.01 06/27/2016       IMPRESSION:    Encounter Diagnoses   Name Primary?    Urethral stricture, unspecified stricture type Yes         PLAN:  I spent 20 minutes with the patient of which more than half was spent in direct consultation with the patient in regards to our treatment and plan.    Asked patient to try to catheterize in office so he could be assisted.  Patient did not want to do it in office because he states that he is not prepared with all of his things that he uses to remain sterile and feels more comfortable doing it at the house.    He was given 12fr catheter to try to see if he can pass.  He will call or email to give an update.  Patient is urinating without difficulty.  His PVR is 120ml.    Patient counseled on the possibility of the stricture redeveloping and may require another procedure.   He was counseled on the symptoms to look for and instructed to follow up if he starts developing such symptoms.      Gema Espana PA-C

## 2017-10-04 NOTE — TELEPHONE ENCOUNTER
----- Message from Joanne Puri sent at 10/4/2017 10:16 AM CDT -----  Contact: pt home [phone] or cell [phone]  Pt states he is in hospice and is having trouble catheterizing himself. Pt states hospice advised him to go to the hospital. Pt is asking to speak with Louann to explain before scheduling an appointment. Pt states it is important because he has been unable to catheterize in a few days and has not been voiding properly. Pt states he is leaving to bring his wife to the store and ask that you call his cell if he doesn't answer his home phone.

## 2017-10-04 NOTE — TELEPHONE ENCOUNTER
For the last few days, having difficulty passing catheter, voids little. Offered 1 pm today with Ibeth Ponce NP He is unable to come at that time. Will come at 3 pm to see PUNEET Brady

## 2017-10-05 ENCOUNTER — PATIENT MESSAGE (OUTPATIENT)
Dept: UROLOGY | Facility: CLINIC | Age: 77
End: 2017-10-05

## 2017-10-06 ENCOUNTER — TELEPHONE (OUTPATIENT)
Dept: UROLOGY | Facility: CLINIC | Age: 77
End: 2017-10-06

## 2017-10-06 NOTE — TELEPHONE ENCOUNTER
Called patient concerning email.  Patient is in hospice.  Says he cannot have additional treatment (cytso with possible dilation or dviu).  He will continue to use 10fr. To keep urethra open.

## 2017-11-25 ENCOUNTER — PATIENT MESSAGE (OUTPATIENT)
Dept: UROLOGY | Facility: CLINIC | Age: 77
End: 2017-11-25

## 2017-11-25 DIAGNOSIS — R39.12 WEAK URINE STREAM: ICD-10-CM

## 2017-11-27 DIAGNOSIS — R39.12 WEAK URINE STREAM: ICD-10-CM

## 2017-11-27 RX ORDER — TAMSULOSIN HYDROCHLORIDE 0.4 MG/1
0.4 CAPSULE ORAL NIGHTLY
Qty: 90 CAPSULE | Refills: 3 | Status: SHIPPED | OUTPATIENT
Start: 2017-11-27 | End: 2017-11-27 | Stop reason: SDUPTHER

## 2017-11-27 RX ORDER — TAMSULOSIN HYDROCHLORIDE 0.4 MG/1
0.4 CAPSULE ORAL NIGHTLY
Qty: 90 CAPSULE | Refills: 3 | Status: SHIPPED | OUTPATIENT
Start: 2017-11-27

## 2017-12-07 ENCOUNTER — TELEPHONE (OUTPATIENT)
Dept: UROLOGY | Facility: CLINIC | Age: 77
End: 2017-12-07

## 2017-12-07 NOTE — TELEPHONE ENCOUNTER
----- Message from Joanne Puri sent at 12/6/2017  2:33 PM CST -----  Contact: pt [phone] or cell [phone]  Pt states he is out of catheters and Hospice asked him to call and notify Louann. Pt states he would like to explain to Louann. Pt is in hospice and has 7 catheters left.     Hospice   Hilda (nurse)  [phone]

## 2017-12-07 NOTE — TELEPHONE ENCOUNTER
Using hislip 10 Fr. Catheters every 2 to 3 days for patency of urethra. Does not qualify for more catheters at this time. [facility] will donate a box of catheters and deliver to patient.

## 2017-12-26 ENCOUNTER — NURSE TRIAGE (OUTPATIENT)
Dept: ADMINISTRATIVE | Facility: CLINIC | Age: 77
End: 2017-12-26

## 2017-12-26 NOTE — TELEPHONE ENCOUNTER
Reason for Disposition   All other urine symptoms    Protocols used: ST URINARY SYMPTOMS-A-AH    Raymond is calling to report he has been unable to insert catheter for approximately 8 days.  States bladder is not fully emptying.  Denies being uncomfortable but is concerned and would like a call from Ms. Ponce.  Please contact Raymond to advise.

## 2017-12-28 ENCOUNTER — OFFICE VISIT (OUTPATIENT)
Dept: UROLOGY | Facility: CLINIC | Age: 77
End: 2017-12-28
Payer: MEDICARE

## 2017-12-28 VITALS
BODY MASS INDEX: 30.66 KG/M2 | DIASTOLIC BLOOD PRESSURE: 73 MMHG | SYSTOLIC BLOOD PRESSURE: 119 MMHG | WEIGHT: 207 LBS | HEIGHT: 69 IN | HEART RATE: 78 BPM

## 2017-12-28 DIAGNOSIS — N35.9 URETHRAL STRICTURE, UNSPECIFIED STRICTURE TYPE: Primary | ICD-10-CM

## 2017-12-28 LAB — POC RESIDUAL URINE VOLUME: 147 ML (ref 0–100)

## 2017-12-28 PROCEDURE — 51798 US URINE CAPACITY MEASURE: CPT | Mod: PBBFAC | Performed by: NURSE PRACTITIONER

## 2017-12-28 PROCEDURE — 99214 OFFICE O/P EST MOD 30 MIN: CPT | Mod: PBBFAC,25 | Performed by: NURSE PRACTITIONER

## 2017-12-28 PROCEDURE — 99212 OFFICE O/P EST SF 10 MIN: CPT | Mod: 25,GW,S$GLB, | Performed by: NURSE PRACTITIONER

## 2017-12-28 PROCEDURE — 51798 US URINE CAPACITY MEASURE: CPT | Mod: GW,S$GLB,, | Performed by: NURSE PRACTITIONER

## 2017-12-28 PROCEDURE — 99999 PR PBB SHADOW E&M-EST. PATIENT-LVL IV: CPT | Mod: PBBFAC,,, | Performed by: NURSE PRACTITIONER

## 2017-12-28 RX ORDER — TRAMADOL HYDROCHLORIDE 50 MG/1
50 TABLET ORAL 4 TIMES DAILY
Refills: 5 | COMMUNITY
Start: 2017-11-25

## 2017-12-28 RX ORDER — DOXYCYCLINE 100 MG/1
CAPSULE ORAL
COMMUNITY
Start: 2017-12-19

## 2017-12-28 RX ORDER — HYOSCYAMINE SULFATE 0.12 MG/1
TABLET SUBLINGUAL
Refills: 0 | COMMUNITY
Start: 2017-11-30

## 2017-12-28 RX ORDER — DIAZEPAM 5 MG/1
TABLET ORAL
Refills: 0 | COMMUNITY
Start: 2017-12-13

## 2017-12-28 NOTE — PROGRESS NOTES
CHIEF COMPLAINT:    Mr. Elizondo is a 77 y.o. male presenting for difficulty passing a catheter.    PRESENTING ILLNESS:    Raymond Elizondo is a 77 y.o. male who presents for difficulty passing a catheter. He is accompanied by his daughter.  He reports inability to completely pass a catheter for the past 10-12 days.  He has tried to do CIC with a 10fr but has had multiple unsuccessful attempts.  He usually does CIC 3 times a week to dilate his urethral stricture.  He does not have any urinary complaints today.  He feels as if he empties his bladder completely and has a strong stream. Denies difficulty urinating.      He is s/p cysto and urethral dilation for meatal stenosis, membranous urethral stricture, and CO2 laser and excisional bx of multiple penile lesions on 11/2/16 with Dr. Hemphill. On 11/4/16, leyva catheter was pulled but pt returned to the ED for urinary retention. Leyva reinserted at ED.   Prior to procedures, he had c/o hesitancy, difficult voiding, and dribbling.     s/p brachytherapy combined with external beam radiation therapy for prostate cancer at  10 years ago.  Hx of urinary retention with blood clots.    Patient is currently on Hospice care d/t cancer.    REVIEW OF SYSTEMS:    Constitutional: Negative for fever and chills.   HENT: Negative for hearing loss.   Eyes: Negative for visual disturbance.   Respiratory: Negative for shortness of breath.   Cardiovascular: Negative for chest pain.   Gastrointestinal: Negative for nausea, vomiting, and constipation.   Genitourinary:  See HPI  Neurological: Negative for dizziness.   Hematological: Does not bruise/bleed easily.   Psychiatric/Behavioral: Negative for confusion.       PATIENT HISTORY:    Past Medical History:   Diagnosis Date    Cirrhosis     COPD (chronic obstructive pulmonary disease)     Coronary artery disease     Diabetes mellitus     Esophageal cancer     Hypertension     Irregular heart beat     Pancreatitis     3x     Prostate cancer     Skin cancer     multiple sites    Urinary tract infection        Past Surgical History:   Procedure Laterality Date    APPENDECTOMY      CHOLECYSTECTOMY  2001    ELBOW FRACTURE SURGERY Right          ESOPHAGEAL RECONSTRUCTION  2011    KNEE ARTHROSCOPY W/ MENISCAL REPAIR Right          PROSTATE BIOPSY  2016    SKIN CANCER EXCISION      Left ear, Left flank, Right flank    TONSILLECTOMY         Family History   Problem Relation Age of Onset    Diabetes Mother     Cancer Mother     Cancer Father     Prostate cancer Neg Hx     Kidney disease Neg Hx        Social History     Social History    Marital status:      Spouse name: N/A    Number of children: N/A    Years of education: N/A     Occupational History    Not on file.     Social History Main Topics    Smoking status: Former Smoker     Packs/day: 0.25     Years: 0.50    Smokeless tobacco: Never Used    Alcohol use No    Drug use: No    Sexual activity: Not Currently     Other Topics Concern    Not on file     Social History Narrative    No narrative on file       Allergies:  Aspirin and Ciprofloxacin    Medications:    Current Outpatient Prescriptions:     albuterol (PROVENTIL) 5 mg/mL nebulizer solution, Take 2.5 mg by nebulization every 6 (six) hours as needed., Disp: , Rfl:     alprazolam (XANAX) 0.25 MG tablet, Take 0.25 mg by mouth 3 (three) times daily as needed for Insomnia or Anxiety. Takes every morning and prn, Disp: , Rfl:     amlodipine (NORVASC) 10 MG tablet, Take 10 mg by mouth every morning. , Disp: , Rfl:     atenolol (TENORMIN) 25 MG tablet, Take 25 mg by mouth every morning. , Disp: , Rfl:     BREO ELLIPTA 100-25 mcg/dose diskus inhaler, Inhale 1 puff into the lungs every morning. , Disp: , Rfl:     bumetanide (BUMEX) 1 MG tablet, Take 1 mg by mouth every morning., Disp: , Rfl:     diazePAM (VALIUM) 5 MG tablet, take 1 TABLET BY MOUTH EVERY 6 hours as needed anxiety and restlessness, Disp:  , Rfl: 0    diphenoxylate-atropine 2.5-0.025 mg (LOMOTIL) 2.5-0.025 mg per tablet, Take 2 tablets by mouth every 6 (six) hours as needed., Disp: , Rfl: 5    doxycycline (VIBRAMYCIN) 100 MG Cap, , Disp: , Rfl:     econazole nitrate 1 % cream, APPLY TO THE NAILS DAILY. FILE NAILS ONCE A WEEK., Disp: , Rfl: 10    fenofibrate 160 MG Tab, Take 160 mg by mouth every morning. , Disp: , Rfl:     glipiZIDE (GLUCOTROL) 5 MG tablet, Take 2.5 mg by mouth daily with breakfast. Takes half of a 5 mg tablet every morning, Disp: , Rfl:     hydrocodone-acetaminophen 5-325mg (NORCO) 5-325 mg per tablet, Take 1 tablet by mouth every 4 (four) hours as needed for Pain., Disp: 21 tablet, Rfl: 0    hyoscyamine (LEVSIN/SL) 0.125 mg Subl, take 1 TABLET BY MOUTH 6 hours as needed EXCESSIVE SECRETIONS, Disp: , Rfl: 0    lorazepam (ATIVAN) 1 MG tablet, Take 1 mg by mouth every 6 (six) hours as needed for Anxiety. Takes two 1 mg tablets every morning and takes 1 mg every bedtime, Disp: , Rfl:     nitroGLYCERIN (NITROSTAT) 0.4 MG SL tablet, Place 0.4 mg under the tongue every 5 (five) minutes as needed for Chest pain., Disp: , Rfl:     omeprazole (PRILOSEC) 20 MG capsule, Take 20 mg by mouth every morning. , Disp: , Rfl:     polyethylene glycol (GLYCOLAX) 17 gram PwPk, Take 17 g by mouth once daily., Disp: 30 packet, Rfl: 0    potassium chloride (MICRO-K) 10 MEQ CpSR, Take 10 mEq by mouth every morning. , Disp: , Rfl:     potassium chloride SA (K-DUR,KLOR-CON) 10 MEQ tablet, Take 10 mEq by mouth 2 (two) times daily., Disp: , Rfl: 1    pravastatin (PRAVACHOL) 40 MG tablet, Take 40 mg by mouth every morning. , Disp: , Rfl:     tamsulosin (FLOMAX) 0.4 mg Cp24, Take 1 capsule (0.4 mg total) by mouth every evening., Disp: 90 capsule, Rfl: 3    traMADol (ULTRAM) 50 mg tablet, Take 50 mg by mouth 4 (four) times daily., Disp: , Rfl: 5    trazodone (DESYREL) 150 MG tablet, Take 150 mg by mouth every evening., Disp: , Rfl: 5    VENTOLIN  HFA 90 mcg/actuation inhaler, Inhale 2 puffs into the lungs every 6 (six) hours as needed. , Disp: , Rfl:     PHYSICAL EXAMINATION:    Constitutional: He appears well-developed and well-nourished.  He is in no apparent distress.    Eyes: No scleral icterus noted bilaterally.  No discharge noted bilaterally.      Cardiovascular: Normal rate.  No pitting edema noted in lower extremities bilaterally    Pulmonary/Chest: Effort normal. No respiratory distress.     Abdominal:  He exhibits no distension.      Neurological: He is alert and oriented to person, place, and time.     Skin: Skin is warm and dry.     Psych: Cooperative with normal affect.    Mouth: dentition is normal.       Genitourinary: The penis is circumcised. The urethral meatus is normal. Scrotum is normal size and contour.      Physical Exam    Bladder scan performed by Nurse Looney.  PVR 147ml  LABS:    Lab Results   Component Value Date    PSADIAG <0.01 06/27/2016       IMPRESSION:    Encounter Diagnoses   Name Primary?    Urethral stricture, unspecified stricture type Yes         PLAN:  -Discussed plan of care with patient and his daughter.   -In and out 10 french and 12 french silicon catheters were inserted without difficulties. 160 cc of clear urine drained from his bladder. Patient tolerated well with minimal discomfort. Was unable to advance 14 french catheter.  -Reassured patient and daughter that he is emptying his bladder well.  -Patient counseled on the possibility of the stricture redeveloping.   He was counseled on the symptoms to look for and instructed to follow up if he starts developing such symptoms.  -Follow up as needed.

## 2018-01-03 ENCOUNTER — PATIENT MESSAGE (OUTPATIENT)
Dept: UROLOGY | Facility: CLINIC | Age: 78
End: 2018-01-03

## 2018-01-04 ENCOUNTER — OFFICE VISIT (OUTPATIENT)
Dept: UROLOGY | Facility: CLINIC | Age: 78
End: 2018-01-04
Payer: MEDICARE

## 2018-01-04 VITALS — HEIGHT: 69 IN | RESPIRATION RATE: 16 BRPM | WEIGHT: 207 LBS | BODY MASS INDEX: 30.66 KG/M2

## 2018-01-04 DIAGNOSIS — Z85.46 H/O PROSTATE CANCER: ICD-10-CM

## 2018-01-04 DIAGNOSIS — N35.9 URETHRAL STRICTURE, UNSPECIFIED STRICTURE TYPE: Primary | ICD-10-CM

## 2018-01-04 PROCEDURE — 99211 OFF/OP EST MAY X REQ PHY/QHP: CPT | Mod: GW,S$GLB,, | Performed by: NURSE PRACTITIONER

## 2018-01-04 PROCEDURE — 99999 PR PBB SHADOW E&M-EST. PATIENT-LVL IV: CPT | Mod: PBBFAC,,, | Performed by: NURSE PRACTITIONER

## 2018-01-04 PROCEDURE — 99214 OFFICE O/P EST MOD 30 MIN: CPT | Mod: PBBFAC | Performed by: NURSE PRACTITIONER

## 2018-01-04 NOTE — PROGRESS NOTES
CHIEF COMPLAINT:    Mr. Elizondo is a 77 y.o. male presenting for difficulty passing a catheter.    PRESENTING ILLNESS:    Raymond Elizondo is a 77 y.o. male who presents for difficulty passing a catheter. He is accompanied by a family member.  He was last seen with me on 12/28 with the same complaints. I was able to insert a 10 fr and 12 fr with no difficulties.  His PVR at the time was 147 cc. Today, he reports inability to completely pass a catheter for the past week.  He has tried to do CIC with a 10fr but has had multiple unsuccessful attempts.  He usually does CIC 3 times a week to dilate his urethral stricture.  He feels as if he empties his bladder completely and has a strong stream. Denies difficulty urinating.      He is s/p cysto and urethral dilation for meatal stenosis, membranous urethral stricture, and CO2 laser and excisional bx of multiple penile lesions on 11/2/16 with Dr. Hemphill. On 11/4/16, leyva catheter was pulled but pt returned to the ED for urinary retention. Leyva reinserted at ED.   Prior to procedures, he had c/o hesitancy, difficult voiding, and dribbling.     s/p brachytherapy combined with external beam radiation therapy for prostate cancer at  10 years ago.  Hx of urinary retention with blood clots.    Patient is currently on Hospice care d/t cancer.    REVIEW OF SYSTEMS:    Constitutional: Negative for fever and chills.   HENT: Negative for hearing loss.   Eyes: Negative for visual disturbance.   Respiratory: Negative for shortness of breath.   Cardiovascular: Negative for chest pain.   Gastrointestinal: Negative for nausea, vomiting, and constipation.   Genitourinary:  See HPI  Neurological: Negative for dizziness.   Hematological: Does not bruise/bleed easily.   Psychiatric/Behavioral: Negative for confusion.       PATIENT HISTORY:    Past Medical History:   Diagnosis Date    Cirrhosis     COPD (chronic obstructive pulmonary disease)     Coronary artery disease     Diabetes  mellitus     Esophageal cancer     Hypertension     Irregular heart beat     Pancreatitis     3x    Prostate cancer     Skin cancer     multiple sites    Urinary tract infection        Past Surgical History:   Procedure Laterality Date    APPENDECTOMY      CHOLECYSTECTOMY  2001    ELBOW FRACTURE SURGERY Right          ESOPHAGEAL RECONSTRUCTION  2011    KNEE ARTHROSCOPY W/ MENISCAL REPAIR Right          PROSTATE BIOPSY  2016    SKIN CANCER EXCISION      Left ear, Left flank, Right flank    TONSILLECTOMY         Family History   Problem Relation Age of Onset    Diabetes Mother     Cancer Mother     Cancer Father     Prostate cancer Neg Hx     Kidney disease Neg Hx        Social History     Social History    Marital status:      Spouse name: N/A    Number of children: N/A    Years of education: N/A     Occupational History    Not on file.     Social History Main Topics    Smoking status: Former Smoker     Packs/day: 0.25     Years: 0.50    Smokeless tobacco: Never Used    Alcohol use No    Drug use: No    Sexual activity: Not Currently     Other Topics Concern    Not on file     Social History Narrative    No narrative on file       Allergies:  Aspirin and Ciprofloxacin    Medications:    Current Outpatient Prescriptions:     albuterol (PROVENTIL) 5 mg/mL nebulizer solution, Take 2.5 mg by nebulization every 6 (six) hours as needed., Disp: , Rfl:     alprazolam (XANAX) 0.25 MG tablet, Take 0.25 mg by mouth 3 (three) times daily as needed for Insomnia or Anxiety. Takes every morning and prn, Disp: , Rfl:     amlodipine (NORVASC) 10 MG tablet, Take 10 mg by mouth every morning. , Disp: , Rfl:     atenolol (TENORMIN) 25 MG tablet, Take 25 mg by mouth every morning. , Disp: , Rfl:     BREO ELLIPTA 100-25 mcg/dose diskus inhaler, Inhale 1 puff into the lungs every morning. , Disp: , Rfl:     bumetanide (BUMEX) 1 MG tablet, Take 1 mg by mouth every morning., Disp: , Rfl:      diazePAM (VALIUM) 5 MG tablet, take 1 TABLET BY MOUTH EVERY 6 hours as needed anxiety and restlessness, Disp: , Rfl: 0    diphenoxylate-atropine 2.5-0.025 mg (LOMOTIL) 2.5-0.025 mg per tablet, Take 2 tablets by mouth every 6 (six) hours as needed., Disp: , Rfl: 5    doxycycline (VIBRAMYCIN) 100 MG Cap, , Disp: , Rfl:     econazole nitrate 1 % cream, APPLY TO THE NAILS DAILY. FILE NAILS ONCE A WEEK., Disp: , Rfl: 10    fenofibrate 160 MG Tab, Take 160 mg by mouth every morning. , Disp: , Rfl:     glipiZIDE (GLUCOTROL) 5 MG tablet, Take 2.5 mg by mouth daily with breakfast. Takes half of a 5 mg tablet every morning, Disp: , Rfl:     hydrocodone-acetaminophen 5-325mg (NORCO) 5-325 mg per tablet, Take 1 tablet by mouth every 4 (four) hours as needed for Pain., Disp: 21 tablet, Rfl: 0    hyoscyamine (LEVSIN/SL) 0.125 mg Subl, take 1 TABLET BY MOUTH 6 hours as needed EXCESSIVE SECRETIONS, Disp: , Rfl: 0    lorazepam (ATIVAN) 1 MG tablet, Take 1 mg by mouth every 6 (six) hours as needed for Anxiety. Takes two 1 mg tablets every morning and takes 1 mg every bedtime, Disp: , Rfl:     nitroGLYCERIN (NITROSTAT) 0.4 MG SL tablet, Place 0.4 mg under the tongue every 5 (five) minutes as needed for Chest pain., Disp: , Rfl:     omeprazole (PRILOSEC) 20 MG capsule, Take 20 mg by mouth every morning. , Disp: , Rfl:     polyethylene glycol (GLYCOLAX) 17 gram PwPk, Take 17 g by mouth once daily., Disp: 30 packet, Rfl: 0    potassium chloride (MICRO-K) 10 MEQ CpSR, Take 10 mEq by mouth every morning. , Disp: , Rfl:     potassium chloride SA (K-DUR,KLOR-CON) 10 MEQ tablet, Take 10 mEq by mouth 2 (two) times daily., Disp: , Rfl: 1    pravastatin (PRAVACHOL) 40 MG tablet, Take 40 mg by mouth every morning. , Disp: , Rfl:     tamsulosin (FLOMAX) 0.4 mg Cp24, Take 1 capsule (0.4 mg total) by mouth every evening., Disp: 90 capsule, Rfl: 3    traMADol (ULTRAM) 50 mg tablet, Take 50 mg by mouth 4 (four) times daily., Disp: ,  Rfl: 5    trazodone (DESYREL) 150 MG tablet, Take 150 mg by mouth every evening., Disp: , Rfl: 5    VENTOLIN HFA 90 mcg/actuation inhaler, Inhale 2 puffs into the lungs every 6 (six) hours as needed. , Disp: , Rfl:     PHYSICAL EXAMINATION:    Constitutional: He appears well-developed and well-nourished.  He is in no apparent distress.    Eyes: No scleral icterus noted bilaterally.  No discharge noted bilaterally.      Cardiovascular: Normal rate.  No pitting edema noted in lower extremities bilaterally    Pulmonary/Chest: Effort normal. No respiratory distress.     Abdominal:  He exhibits no distension.      Neurological: He is alert and oriented to person, place, and time.     Skin: Skin is warm and dry.     Psych: Cooperative with normal affect.    Mouth: dentition is normal.       Genitourinary: The penis is circumcised. The urethral meatus is normal. Scrotum is normal size and contour.      Physical Exam    He performed in and out cath w/ a 10 fr without difficulties. He urinated 400 cc and then did the in and out cath but did not measure the catheter output. The urine drained onto the floor and made a small puddle that did not saturate a rag.     LABS:    Lab Results   Component Value Date    PSADIAG <0.01 06/27/2016       IMPRESSION:    Encounter Diagnoses   Name Primary?    Urethral stricture, unspecified stricture type Yes    H/O prostate cancer          PLAN:  -Reassured patient and family member,   -Multiple sample 10 fr catheters handed to the pt,  -Reassured patient that he is emptying his bladder well.  -Patient counseled on the possibility of the stricture redeveloping.   He was counseled on the symptoms to look for and instructed to follow up if he starts developing such symptoms.  -Follow up as needed.

## 2018-03-07 ENCOUNTER — PATIENT MESSAGE (OUTPATIENT)
Dept: UROLOGY | Facility: CLINIC | Age: 78
End: 2018-03-07

## 2018-03-07 ENCOUNTER — TELEPHONE (OUTPATIENT)
Dept: UROLOGY | Facility: CLINIC | Age: 78
End: 2018-03-07

## 2018-03-07 DIAGNOSIS — R30.0 DYSURIA: Primary | ICD-10-CM

## 2018-03-08 ENCOUNTER — LAB VISIT (OUTPATIENT)
Dept: LAB | Facility: HOSPITAL | Age: 78
End: 2018-03-08
Attending: NURSE PRACTITIONER
Payer: MEDICARE

## 2018-03-08 DIAGNOSIS — R30.0 DYSURIA: ICD-10-CM

## 2018-03-08 PROCEDURE — 87077 CULTURE AEROBIC IDENTIFY: CPT

## 2018-03-08 PROCEDURE — 87086 URINE CULTURE/COLONY COUNT: CPT

## 2018-03-08 PROCEDURE — 87186 SC STD MICRODIL/AGAR DIL: CPT

## 2018-03-08 PROCEDURE — 87088 URINE BACTERIA CULTURE: CPT

## 2018-03-10 LAB — BACTERIA UR CULT: NORMAL

## 2018-03-11 ENCOUNTER — PATIENT MESSAGE (OUTPATIENT)
Dept: UROLOGY | Facility: CLINIC | Age: 78
End: 2018-03-11

## 2018-03-13 ENCOUNTER — TELEPHONE (OUTPATIENT)
Dept: UROLOGY | Facility: CLINIC | Age: 78
End: 2018-03-13

## 2018-03-13 ENCOUNTER — PATIENT MESSAGE (OUTPATIENT)
Dept: UROLOGY | Facility: CLINIC | Age: 78
End: 2018-03-13

## 2018-03-13 DIAGNOSIS — N31.9 NEUROGENIC BLADDER: Primary | ICD-10-CM

## 2018-03-13 DIAGNOSIS — N30.00 ACUTE CYSTITIS WITHOUT HEMATURIA: ICD-10-CM

## 2018-03-13 RX ORDER — CEPHALEXIN 500 MG/1
500 CAPSULE ORAL 2 TIMES DAILY
Qty: 14 CAPSULE | Refills: 0 | Status: SHIPPED | OUTPATIENT
Start: 2018-03-13 | End: 2018-03-23

## 2018-03-13 NOTE — TELEPHONE ENCOUNTER
Spoke with pt and informed him of uti and instructed him to take antibiotic that was called in to his pharmacy. Pt voiced understanding

## 2018-03-14 ENCOUNTER — TELEPHONE (OUTPATIENT)
Dept: UROLOGY | Facility: CLINIC | Age: 78
End: 2018-03-14

## 2018-03-14 ENCOUNTER — PATIENT MESSAGE (OUTPATIENT)
Dept: UROLOGY | Facility: CLINIC | Age: 78
End: 2018-03-14

## 2018-03-14 NOTE — TELEPHONE ENCOUNTER
Called to discuss catheters. He states he has 2 red rubbers left. Explained that he could reuse them and w/ sterilize in boiling water in btw use. Explained I would send in more order catheters. He verbalized understanding.

## 2018-05-09 ENCOUNTER — LAB VISIT (OUTPATIENT)
Dept: LAB | Facility: HOSPITAL | Age: 78
End: 2018-05-09
Attending: NURSE PRACTITIONER
Payer: MEDICARE

## 2018-05-09 ENCOUNTER — PATIENT MESSAGE (OUTPATIENT)
Dept: UROLOGY | Facility: CLINIC | Age: 78
End: 2018-05-09

## 2018-05-09 DIAGNOSIS — N35.9 URETHRAL STRICTURE, UNSPECIFIED STRICTURE TYPE: ICD-10-CM

## 2018-05-09 DIAGNOSIS — R39.15 URGENCY OF URINATION: ICD-10-CM

## 2018-05-09 DIAGNOSIS — N31.9 NEUROGENIC BLADDER: ICD-10-CM

## 2018-05-09 DIAGNOSIS — N31.9 NEUROGENIC BLADDER: Primary | ICD-10-CM

## 2018-05-09 DIAGNOSIS — R35.0 URINARY FREQUENCY: ICD-10-CM

## 2018-05-09 DIAGNOSIS — R30.0 DYSURIA: Primary | ICD-10-CM

## 2018-05-09 PROCEDURE — 87086 URINE CULTURE/COLONY COUNT: CPT

## 2018-05-09 RX ORDER — CEPHALEXIN 500 MG/1
500 CAPSULE ORAL 2 TIMES DAILY
Qty: 6 CAPSULE | Refills: 0 | Status: SHIPPED | OUTPATIENT
Start: 2018-05-09 | End: 2018-05-12

## 2018-05-10 LAB — BACTERIA UR CULT: NORMAL

## 2018-05-15 ENCOUNTER — TELEPHONE (OUTPATIENT)
Dept: UROLOGY | Facility: CLINIC | Age: 78
End: 2018-05-15

## 2018-05-15 DIAGNOSIS — N35.9 URETHRAL STRICTURE, UNSPECIFIED STRICTURE TYPE: Primary | ICD-10-CM

## 2018-05-15 DIAGNOSIS — R33.9 INCOMPLETE BLADDER EMPTYING: ICD-10-CM

## 2018-05-15 NOTE — TELEPHONE ENCOUNTER
Called pt he reports dysuria, urgency with small voids, frequency q 15 minutes, and incomplete bladder emptying. UC on 5/9/18 was negative. He states when he caths himself with the 10 fr coude he has blood clots and tissue come out of the cath. He reports that he had similar symptoms when he had to get his urethral dilated. He is desiring to have a procedure to have his urethra dilated again. I notified Dr. Hemphill about pt. He states to schedule him for a cysto w/ quanta laser DVIU under anesthesia. I notified pt and discussed procedure with him. Answer all questions. Explained to get repeat UC before procedure. I explained if he stopped urinating to go to ER or notify us immediately.  Verbalized understanding.

## 2018-05-30 ENCOUNTER — LAB VISIT (OUTPATIENT)
Dept: LAB | Facility: HOSPITAL | Age: 78
End: 2018-05-30
Attending: NURSE PRACTITIONER
Payer: MEDICARE

## 2018-05-30 DIAGNOSIS — N39.0 RECURRENT UTI: ICD-10-CM

## 2018-05-30 PROCEDURE — 87088 URINE BACTERIA CULTURE: CPT

## 2018-05-30 PROCEDURE — 87077 CULTURE AEROBIC IDENTIFY: CPT

## 2018-05-30 PROCEDURE — 87186 SC STD MICRODIL/AGAR DIL: CPT

## 2018-05-30 PROCEDURE — 87086 URINE CULTURE/COLONY COUNT: CPT

## 2018-06-02 LAB — BACTERIA UR CULT: NORMAL

## 2018-06-04 ENCOUNTER — TELEPHONE (OUTPATIENT)
Dept: UROLOGY | Facility: CLINIC | Age: 78
End: 2018-06-04

## 2018-06-04 DIAGNOSIS — N30.01 ACUTE CYSTITIS WITH HEMATURIA: Primary | ICD-10-CM

## 2018-06-04 DIAGNOSIS — T78.40XA ALLERGIC STATE, INITIAL ENCOUNTER: ICD-10-CM

## 2018-06-04 RX ORDER — CETIRIZINE HYDROCHLORIDE 1 MG/ML
10 SOLUTION ORAL DAILY
Qty: 60 ML | Refills: 0 | Status: SHIPPED | OUTPATIENT
Start: 2018-06-04 | End: 2018-06-18

## 2018-06-04 RX ORDER — CIPROFLOXACIN 250 MG/1
250 TABLET, FILM COATED ORAL 2 TIMES DAILY
Qty: 18 TABLET | Refills: 0 | Status: ON HOLD | OUTPATIENT
Start: 2018-06-04 | End: 2018-06-13

## 2018-06-04 NOTE — TELEPHONE ENCOUNTER
Called to discuss cipro allergy. He states he does not believe he has any allergies. Hospice nurse states they did not have any allergies recorded for him. Explained to try cipro and if any problems stop the med. May try to take zyrtec with cipro. Verbalized understanding.

## 2018-06-07 DIAGNOSIS — Z01.818 PREOPERATIVE TESTING: Primary | ICD-10-CM

## 2018-06-07 NOTE — PRE ADMISSION SCREENING
"Anesthesia Assessment: Preoperative EQUATION    Planned Procedure: Procedure(s) (LRB):  CYSTOSCOPY W/BLADDER BIOPSY,POSSIBLE FULGURATION-BLADDER (N/A)  URETHROTOMY-DIRECT VISUAL INTERNAL (DVIU) WITH QUANTA LASER (N/A)  DILATION-URETHRA (N/A)  Requested Anesthesia Type:General  Surgeon: Connor Hemphill MD  Service: Urology  Known or anticipated Date of Surgery:6/13/2018    Surgeon notes: reviewed    Electronic QUestionnaire Assessment completed via nurse interview with patient.        NO AQ    Triage considerations:     The patient has no apparent active cardiac condition (No unstable coronary Syndrome such as severe unstable angina or recent [<1 month] myocardial infarction, decompensated CHF, severe valvular   disease or significant arrhythmia)    Previous anesthesia records:GETA, MAC and No problems 6/2017 bursectomy Airway/Jaw/Neck:  Airway Findings: Mouth Opening: Normal Tongue: Normal  Mallampati: II  TM Distance: Normal, at least 6 cm  Jaw/Neck Findings:  Neck ROM: Normal ROM  Neck Findings: Normal      Last PCP note: 3-6 months ago , outside [facility]   Subspecialty notes: pt in Hospice care for esophageal cancer    Other important co-morbidities: COPD/smoker, NADEEN, DM2, HTN, CAD (S/P "mild" MI), GERD, esophageal cancer     Tests already available:  Available tests,  > 1 year ago . 6/2017 EKG, CBC, BMP. 2016 A1c.            Instructions given. (See in Nurse's note)    Optimization:  Anesthesia Preop Clinic Assessment  Indicated-case discussed with Dr Bautista-no further orders noted, will not require clearance.        Plan:    Testing:  BMP     Patient  has previously scheduled Medical Appointment: none    Navigation: Tests Scheduled. Am of surgery                           Straight Line to surgery.                "

## 2018-06-12 ENCOUNTER — TELEPHONE (OUTPATIENT)
Dept: UROLOGY | Facility: CLINIC | Age: 78
End: 2018-06-12

## 2018-06-13 ENCOUNTER — TELEPHONE (OUTPATIENT)
Dept: UROLOGY | Facility: CLINIC | Age: 78
End: 2018-06-13

## 2018-06-13 ENCOUNTER — HOSPITAL ENCOUNTER (OUTPATIENT)
Facility: HOSPITAL | Age: 78
Discharge: HOME | End: 2018-06-13
Attending: UROLOGY | Admitting: UROLOGY
Payer: MEDICARE

## 2018-06-13 ENCOUNTER — DOCUMENTATION ONLY (OUTPATIENT)
Dept: UROLOGY | Facility: CLINIC | Age: 78
End: 2018-06-13

## 2018-06-13 ENCOUNTER — SURGERY (OUTPATIENT)
Age: 78
End: 2018-06-13

## 2018-06-13 VITALS
WEIGHT: 180 LBS | HEIGHT: 69 IN | RESPIRATION RATE: 20 BRPM | DIASTOLIC BLOOD PRESSURE: 56 MMHG | HEART RATE: 60 BPM | SYSTOLIC BLOOD PRESSURE: 120 MMHG | OXYGEN SATURATION: 95 % | TEMPERATURE: 99 F | BODY MASS INDEX: 26.66 KG/M2

## 2018-06-13 DIAGNOSIS — Z01.818 PREOPERATIVE TESTING: ICD-10-CM

## 2018-06-13 DIAGNOSIS — N30.01 ACUTE CYSTITIS WITH HEMATURIA: ICD-10-CM

## 2018-06-13 DIAGNOSIS — N35.014 POST-TRAUMATIC MALE URETHRAL STRICTURE: Primary | ICD-10-CM

## 2018-06-13 DIAGNOSIS — R31.0 GROSS HEMATURIA: ICD-10-CM

## 2018-06-13 DIAGNOSIS — R33.8 ACUTE URINARY RETENTION: Primary | ICD-10-CM

## 2018-06-13 DIAGNOSIS — N35.919 URETHRAL STRICTURE: ICD-10-CM

## 2018-06-13 LAB
ANION GAP SERPL CALC-SCNC: 8 MMOL/L
BUN SERPL-MCNC: 21 MG/DL
CALCIUM SERPL-MCNC: 10.3 MG/DL
CHLORIDE SERPL-SCNC: 99 MMOL/L
CO2 SERPL-SCNC: 29 MMOL/L
CREAT SERPL-MCNC: 0.8 MG/DL
EST. GFR  (AFRICAN AMERICAN): >60 ML/MIN/1.73 M^2
EST. GFR  (NON AFRICAN AMERICAN): >60 ML/MIN/1.73 M^2
GLUCOSE SERPL-MCNC: 112 MG/DL
POTASSIUM SERPL-SCNC: 4.2 MMOL/L
SODIUM SERPL-SCNC: 136 MMOL/L

## 2018-06-13 PROCEDURE — 80048 BASIC METABOLIC PNL TOTAL CA: CPT

## 2018-06-13 PROCEDURE — 87186 SC STD MICRODIL/AGAR DIL: CPT

## 2018-06-13 PROCEDURE — 87077 CULTURE AEROBIC IDENTIFY: CPT

## 2018-06-13 PROCEDURE — 25000003 PHARM REV CODE 250: Performed by: STUDENT IN AN ORGANIZED HEALTH CARE EDUCATION/TRAINING PROGRAM

## 2018-06-13 PROCEDURE — 63600175 PHARM REV CODE 636 W HCPCS: Performed by: STUDENT IN AN ORGANIZED HEALTH CARE EDUCATION/TRAINING PROGRAM

## 2018-06-13 PROCEDURE — 87088 URINE BACTERIA CULTURE: CPT

## 2018-06-13 PROCEDURE — 87086 URINE CULTURE/COLONY COUNT: CPT

## 2018-06-13 PROCEDURE — 36415 COLL VENOUS BLD VENIPUNCTURE: CPT

## 2018-06-13 RX ORDER — LIDOCAINE HYDROCHLORIDE 10 MG/ML
1 INJECTION, SOLUTION EPIDURAL; INFILTRATION; INTRACAUDAL; PERINEURAL ONCE
Status: COMPLETED | OUTPATIENT
Start: 2018-06-13 | End: 2018-06-13

## 2018-06-13 RX ORDER — CIPROFLOXACIN 500 MG/1
500 TABLET ORAL 2 TIMES DAILY
Qty: 20 TABLET | Refills: 0 | Status: SHIPPED | OUTPATIENT
Start: 2018-06-13 | End: 2018-06-23

## 2018-06-13 RX ORDER — SODIUM CHLORIDE 9 MG/ML
INJECTION, SOLUTION INTRAVENOUS CONTINUOUS
Status: DISCONTINUED | OUTPATIENT
Start: 2018-06-13 | End: 2018-06-13 | Stop reason: HOSPADM

## 2018-06-13 RX ORDER — CIPROFLOXACIN 250 MG/1
250 TABLET, FILM COATED ORAL 2 TIMES DAILY
Qty: 20 TABLET | Refills: 0 | Status: SHIPPED | OUTPATIENT
Start: 2018-06-13 | End: 2018-06-23

## 2018-06-13 RX ADMIN — GENTAMICIN SULFATE 240 MG: 40 INJECTION, SOLUTION INTRAMUSCULAR; INTRAVENOUS at 09:06

## 2018-06-13 RX ADMIN — LIDOCAINE HYDROCHLORIDE 10 MG: 10 INJECTION, SOLUTION EPIDURAL; INFILTRATION; INTRACAUDAL; PERINEURAL at 09:06

## 2018-06-13 RX ADMIN — SODIUM CHLORIDE: 0.9 INJECTION, SOLUTION INTRAVENOUS at 09:06

## 2018-06-13 NOTE — PLAN OF CARE
Dr. Hemphill cancelled case due to active UTI.  Pt will be given antibiotics prescription from .  Procedure will be rescheduled.  Order given to give patient one time dose of gentamycin I.V.  before  discharging patient home.  Gentamycin given.  Ordered given to send urine to lab for culture.  Urine sent.

## 2018-06-13 NOTE — TELEPHONE ENCOUNTER
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

## 2018-06-13 NOTE — DISCHARGE SUMMARY
[facility]  Discharge Note  Short Stay    Admit Date: 6/13/2018    Discharge Date and Time: 06/13/2018 9:31 AM      Attending Physician: Connor Hemphill MD     Discharge Provider: Octavio Ruiz    Diagnoses:  Active Hospital Problems    Diagnosis  POA    Urethral stricture [N35.9]  Yes      Resolved Hospital Problems    Diagnosis Date Resolved POA   No resolved problems to display.       Discharged Condition: good    Hospital Course: Patient was admitted for planned DVIU and urethral dilation. Unfortunately his urine dipstick was positive for nitrites, leuks and blood. We will send for culture and treat for now according to his last culture results on 5/30 + for pseudomonas. Will send home with cipro for now and give 1 dose of gentamycin here today.    He has documented cipro allergy but has taken it in the past without issue       The patient was discharged home in good condition on the same day.       Final Diagnoses: Same as principal problem.    Disposition: Home or Self Care    Follow up/Patient Instructions:    Medications:  Reconciled Home Medications:   Current Discharge Medication List      START taking these medications    Details   !! ciprofloxacin HCl (CIPRO) 500 MG tablet Take 1 tablet (500 mg total) by mouth 2 (two) times daily.  Qty: 20 tablet, Refills: 0       !! - Potential duplicate medications found. Please discuss with provider.      CONTINUE these medications which have NOT CHANGED    Details   albuterol (PROVENTIL) 5 mg/mL nebulizer solution Take 2.5 mg by nebulization every 6 (six) hours as needed.      alprazolam (XANAX) 0.25 MG tablet Take 0.25 mg by mouth 3 (three) times daily as needed for Insomnia or Anxiety. Takes every morning and prn      amlodipine (NORVASC) 10 MG tablet Take 10 mg by mouth every morning.       BREO ELLIPTA 100-25 mcg/dose diskus inhaler Inhale 1 puff into the lungs every morning.       bumetanide (BUMEX) 1 MG tablet Take 1 mg by mouth every morning.       diazePAM (VALIUM) 5 MG tablet take 1 TABLET BY MOUTH EVERY 6 hours as needed anxiety and restlessness  Refills: 0      glipiZIDE (GLUCOTROL) 5 MG tablet Take 2.5 mg by mouth daily with breakfast. Takes half of a 5 mg tablet every morning      lorazepam (ATIVAN) 1 MG tablet Take 1 mg by mouth every 6 (six) hours as needed for Anxiety. Takes two 1 mg tablets every morning and takes 1 mg every bedtime      nitroGLYCERIN (NITROSTAT) 0.4 MG SL tablet Place 0.4 mg under the tongue every 5 (five) minutes as needed for Chest pain.      potassium chloride (MICRO-K) 10 MEQ CpSR Take 10 mEq by mouth every morning.       tamsulosin (FLOMAX) 0.4 mg Cp24 Take 1 capsule (0.4 mg total) by mouth every evening.  Qty: 90 capsule, Refills: 3    Associated Diagnoses: Weak urine stream      trazodone (DESYREL) 150 MG tablet Take 150 mg by mouth every evening.  Refills: 5      atenolol (TENORMIN) 25 MG tablet Take 25 mg by mouth every morning.       cetirizine (ZYRTEC) 1 mg/mL syrup Take 10 mLs (10 mg total) by mouth once daily.  Qty: 60 mL, Refills: 0    Associated Diagnoses: Acute cystitis with hematuria; Allergic state, initial encounter      !! ciprofloxacin HCl (CIPRO) 250 MG tablet Take 1 tablet (250 mg total) by mouth 2 (two) times daily.  Qty: 18 tablet, Refills: 0    Associated Diagnoses: Acute cystitis with hematuria      diphenoxylate-atropine 2.5-0.025 mg (LOMOTIL) 2.5-0.025 mg per tablet Take 2 tablets by mouth every 6 (six) hours as needed.  Refills: 5      doxycycline (VIBRAMYCIN) 100 MG Cap       econazole nitrate 1 % cream APPLY TO THE NAILS DAILY. FILE NAILS ONCE A WEEK.  Refills: 10      fenofibrate 160 MG Tab Take 160 mg by mouth every morning.       hydrocodone-acetaminophen 5-325mg (NORCO) 5-325 mg per tablet Take 1 tablet by mouth every 4 (four) hours as needed for Pain.  Qty: 21 tablet, Refills: 0      hyoscyamine (LEVSIN/SL) 0.125 mg Subl take 1 TABLET BY MOUTH 6 hours as needed EXCESSIVE SECRETIONS  Refills: 0       omeprazole (PRILOSEC) 20 MG capsule Take 20 mg by mouth every morning.       polyethylene glycol (GLYCOLAX) 17 gram PwPk Take 17 g by mouth once daily.  Qty: 30 packet, Refills: 0      potassium chloride SA (K-DUR,KLOR-CON) 10 MEQ tablet Take 10 mEq by mouth 2 (two) times daily.  Refills: 1      pravastatin (PRAVACHOL) 40 MG tablet Take 40 mg by mouth every morning.       traMADol (ULTRAM) 50 mg tablet Take 50 mg by mouth 4 (four) times daily.  Refills: 5      VENTOLIN HFA 90 mcg/actuation inhaler Inhale 2 puffs into the lungs every 6 (six) hours as needed.        !! - Potential duplicate medications found. Please discuss with provider.        No discharge procedures on file.  Follow-up Information     Connor Hemphill MD.    Specialty:  Urology  Why:  for surgery  Contact information:  [address] [facility] [id number] [phone]                   Discharge Procedure Orders (must include Diet, Follow-up, Activity):  No discharge procedures on file.

## 2018-06-14 ENCOUNTER — PATIENT MESSAGE (OUTPATIENT)
Dept: UROLOGY | Facility: CLINIC | Age: 78
End: 2018-06-14

## 2018-06-14 ENCOUNTER — TELEPHONE (OUTPATIENT)
Dept: UROLOGY | Facility: CLINIC | Age: 78
End: 2018-06-14

## 2018-06-15 LAB — BACTERIA UR CULT: NORMAL

## 2018-06-18 ENCOUNTER — ANESTHESIA EVENT (OUTPATIENT)
Dept: SURGERY | Facility: HOSPITAL | Age: 78
End: 2018-06-18
Payer: MEDICARE

## 2018-06-18 NOTE — ANESTHESIA PREPROCEDURE EVALUATION
"      Pre Admission Screening  Natalia Munoz RN      []Hide copied text  Anesthesia Assessment: Preoperative EQUATION     Planned Procedure: Procedure(s) (LRB):  CYSTOSCOPY (N/A)  CYSTOSCOPY, WITH DIRECT VISION INTERNAL URETHROTOMY WITH QUANTA LASER (N/A)  DILATION, URETHRA (N/A)  Requested Anesthesia Type:General  Surgeon: Connor Hemphill MD  Service: Urology  Known or anticipated Date of Surgery:6/20/2018     Surgeon notes: reviewed-this surgery was rescheduled from 6/13 to 6/20 due to a UTI, being treated with abx by surgeon.     Electronic QUestionnaire Assessment completed via nurse interview with patient.         NO AQ     Triage considerations:      The patient has no apparent active cardiac condition (No unstable coronary Syndrome such as severe unstable angina or recent [<1 month] myocardial infarction, decompensated CHF, severe valvular   disease or significant arrhythmia)     Previous anesthesia records:GETA, MAC and No problems 6/2017 bursectomy Airway/Jaw/Neck:  Airway Findings: Mouth Opening: Normal Tongue: Normal  Mallampati: II  TM Distance: Normal, at least 6 cm  Jaw/Neck Findings:  Neck ROM: Normal ROM  Neck Findings: Normal       Last PCP note: 3-6 months ago , outside [facility]   Subspecialty notes: pt in Hospice care for esophageal cancer     Other important co-morbidities: COPD/smoker, NADEEN, DM2, HTN, CAD (S/P "mild" MI), GERD, esophageal cancer     Tests already available:  Available tests,  within 1 month . 6/13/18 BMP. 6/2017 EKG, CBC, BMP. 2016 A1c.                            Instructions given. (See in Nurse's note)     Optimization:  Anesthesia Preop Clinic Assessment  Indicated-case discussed with Dr Bautista-no further orders noted, will not require clearance.                   Plan:    Testing:  none                           Patient  has previously scheduled Medical Appointment: none     Navigation:                            Straight Line to surgery.                          No tests, " anesthesia preop clinic visit, or consult required.                                                       Electronically signed by Natalia Munoz RN at 6/18/2018  9:37 AM        Pre-admit on 6/20/2018            Detailed Report                                                                                                                   06/18/2018  Raymond Elizondo is a 78 y.o., male.    Anesthesia Evaluation    I have reviewed the Patient Summary Reports.     I have reviewed the Medications.     Review of Systems  Anesthesia Hx:  No problems with previous Anesthesia  History of prior surgery of interest to airway management or planning: esophageal. Denies Family Hx of Anesthesia complications.   Denies Personal Hx of Anesthesia complications.   Social:  Smoker    Hematology/Oncology:  Hematology Normal       Esophageal Current/Recent Cancer. (pt terminal-in hospice) Oncology Comments: HX prostate cancer, HX skin cancer. And current esophageal cancer on home hospice.      EENT/Dental:   Throat Symptoms include Hoarseness and Swallowing difficulty or pain  Throat Disease: esophagitis   Cardiovascular:   Hypertension Dysrhythmias atrial fibrillation Denies Angina.  Functional Capacity 2 METS  Hypertension , Well Controlled on Rx    Pulmonary:   COPD Denies Shortness of breath.  Denies Recent URI.  Pulmonary Symptoms:  are dependence on supplemental O2.  Dependence on O2 is PRN with activity  Chronic Obstructive Pulmonary Disease (COPD):  is secondary to smoking. Inhaler use is maintenance inhaler daily and rescue inhaler PRN. Current breathing status is optimal, free of wheezing.    Renal/:  Renal Symptoms/Infections/Stones:  Urinary Tract Infection (UTI) (this surgery was rescheduled from 6/13 to 6/20 due to UTI-taking abx) Other Renal / Gu Conditions: (urethral stricture)   Hepatic/GI:   GERD  Liver Disease , Cirrhosis    Musculoskeletal:  Musculoskeletal Normal    Neurological:  Neurology Normal     Endocrine:  Diabetes, Type 2 Diabetes , most recent HgA1c value was 5.2 on 2016.    Psych:  Psychiatric Normal           Physical Exam   Airway/Jaw/Neck:  Airway Findings: Mouth Opening: Normal General Airway Assessment: Adult  Mallampati: I  TM Distance: Normal, at least 6 cm  Jaw/Neck Findings:  Neck ROM: Normal ROM      Dental:  Dental Findings: Lower Dentures, Upper Dentures   Chest/Lungs:  Chest/Lungs Clear    Heart/Vascular:  Heart Findings: Normal       Mental Status:  Mental Status Findings:  Cooperative, Alert and Oriented         Anesthesia Plan  Type of Anesthesia, risks & benefits discussed:  Anesthesia Type:  general  Patient's Preference:   Intra-op Monitoring Plan: standard ASA monitors  Intra-op Monitoring Plan Comments:   Post Op Pain Control Plan: per primary service following discharge from PACU  Post Op Pain Control Plan Comments:   Induction:   IV and rapid sequence  Beta Blocker:  Patient is on a Beta-Blocker and has received one dose within the past 24 hours (No further documentation required).       Informed Consent: Patient understands risks and agrees with Anesthesia plan.  Questions answered. Anesthesia consent signed with patient.  ASA Score: 3     Day of Surgery Review of History & Physical:            Ready For Surgery From Anesthesia Perspective.

## 2018-06-19 ENCOUNTER — TELEPHONE (OUTPATIENT)
Dept: UROLOGY | Facility: CLINIC | Age: 78
End: 2018-06-19

## 2018-06-19 NOTE — TELEPHONE ENCOUNTER
Called pt to confirm 9:45am arrival time for procedure. Gave pt NPO instructions and gave pt opportunity to ask questions. Pt verbalized understanding.

## 2018-06-20 ENCOUNTER — ANESTHESIA (OUTPATIENT)
Dept: SURGERY | Facility: HOSPITAL | Age: 78
End: 2018-06-20
Payer: MEDICARE

## 2018-06-20 ENCOUNTER — SURGERY (OUTPATIENT)
Age: 78
End: 2018-06-20

## 2018-06-20 ENCOUNTER — HOSPITAL ENCOUNTER (OUTPATIENT)
Facility: HOSPITAL | Age: 78
Discharge: HOME | End: 2018-06-20
Attending: UROLOGY | Admitting: UROLOGY
Payer: MEDICARE

## 2018-06-20 VITALS
SYSTOLIC BLOOD PRESSURE: 105 MMHG | WEIGHT: 167.75 LBS | HEIGHT: 69 IN | BODY MASS INDEX: 24.85 KG/M2 | TEMPERATURE: 98 F | DIASTOLIC BLOOD PRESSURE: 68 MMHG | OXYGEN SATURATION: 96 % | RESPIRATION RATE: 24 BRPM | HEART RATE: 52 BPM

## 2018-06-20 DIAGNOSIS — N35.919 URETHRAL STRICTURE: Primary | ICD-10-CM

## 2018-06-20 LAB — POCT GLUCOSE: 125 MG/DL (ref 70–110)

## 2018-06-20 PROCEDURE — 36000707: Performed by: UROLOGY

## 2018-06-20 PROCEDURE — D9220A PRA ANESTHESIA: Mod: ANES,,, | Performed by: ANESTHESIOLOGY

## 2018-06-20 PROCEDURE — 63600175 PHARM REV CODE 636 W HCPCS: Performed by: NURSE ANESTHETIST, CERTIFIED REGISTERED

## 2018-06-20 PROCEDURE — 71000033 HC RECOVERY, INTIAL HOUR: Performed by: UROLOGY

## 2018-06-20 PROCEDURE — 25000003 PHARM REV CODE 250: Performed by: STUDENT IN AN ORGANIZED HEALTH CARE EDUCATION/TRAINING PROGRAM

## 2018-06-20 PROCEDURE — 63600175 PHARM REV CODE 636 W HCPCS: Performed by: STUDENT IN AN ORGANIZED HEALTH CARE EDUCATION/TRAINING PROGRAM

## 2018-06-20 PROCEDURE — 37000008 HC ANESTHESIA 1ST 15 MINUTES: Performed by: UROLOGY

## 2018-06-20 PROCEDURE — 82962 GLUCOSE BLOOD TEST: CPT | Performed by: UROLOGY

## 2018-06-20 PROCEDURE — 25000003 PHARM REV CODE 250: Performed by: UROLOGY

## 2018-06-20 PROCEDURE — 37000009 HC ANESTHESIA EA ADD 15 MINS: Performed by: UROLOGY

## 2018-06-20 PROCEDURE — 25000003 PHARM REV CODE 250: Performed by: NURSE ANESTHETIST, CERTIFIED REGISTERED

## 2018-06-20 PROCEDURE — D9220A PRA ANESTHESIA: Mod: CRNA,,, | Performed by: NURSE ANESTHETIST, CERTIFIED REGISTERED

## 2018-06-20 PROCEDURE — 36000706: Performed by: UROLOGY

## 2018-06-20 PROCEDURE — 52276 CYSTOSCOPY AND TREATMENT: CPT | Mod: GW,,, | Performed by: UROLOGY

## 2018-06-20 PROCEDURE — 76000 FLUOROSCOPY <1 HR PHYS/QHP: CPT | Mod: 26,59,GW, | Performed by: UROLOGY

## 2018-06-20 PROCEDURE — 71000015 HC POSTOP RECOV 1ST HR: Performed by: UROLOGY

## 2018-06-20 RX ORDER — LIDOCAINE HYDROCHLORIDE 10 MG/ML
1 INJECTION, SOLUTION EPIDURAL; INFILTRATION; INTRACAUDAL; PERINEURAL ONCE
Status: COMPLETED | OUTPATIENT
Start: 2018-06-20 | End: 2018-06-20

## 2018-06-20 RX ORDER — LIDOCAINE HCL/PF 100 MG/5ML
SYRINGE (ML) INTRAVENOUS
Status: DISCONTINUED | OUTPATIENT
Start: 2018-06-20 | End: 2018-06-20

## 2018-06-20 RX ORDER — ROCURONIUM BROMIDE 10 MG/ML
INJECTION, SOLUTION INTRAVENOUS
Status: DISCONTINUED | OUTPATIENT
Start: 2018-06-20 | End: 2018-06-20

## 2018-06-20 RX ORDER — SODIUM CHLORIDE 9 MG/ML
INJECTION, SOLUTION INTRAVENOUS CONTINUOUS
Status: DISCONTINUED | OUTPATIENT
Start: 2018-06-20 | End: 2018-06-20 | Stop reason: HOSPADM

## 2018-06-20 RX ORDER — PROPOFOL 10 MG/ML
VIAL (ML) INTRAVENOUS
Status: DISCONTINUED | OUTPATIENT
Start: 2018-06-20 | End: 2018-06-20

## 2018-06-20 RX ORDER — MORPHINE SULFATE 20 MG/ML
10 SOLUTION ORAL EVERY 4 HOURS PRN
COMMUNITY

## 2018-06-20 RX ORDER — SODIUM CHLORIDE 0.9 % (FLUSH) 0.9 %
3 SYRINGE (ML) INJECTION
Status: DISCONTINUED | OUTPATIENT
Start: 2018-06-20 | End: 2018-06-20 | Stop reason: HOSPADM

## 2018-06-20 RX ORDER — ONDANSETRON 2 MG/ML
INJECTION INTRAMUSCULAR; INTRAVENOUS
Status: DISCONTINUED | OUTPATIENT
Start: 2018-06-20 | End: 2018-06-20

## 2018-06-20 RX ORDER — HYDROCODONE BITARTRATE AND ACETAMINOPHEN 5; 325 MG/1; MG/1
1 TABLET ORAL EVERY 4 HOURS PRN
Status: DISCONTINUED | OUTPATIENT
Start: 2018-06-20 | End: 2018-06-20 | Stop reason: HOSPADM

## 2018-06-20 RX ORDER — FENTANYL CITRATE 50 UG/ML
INJECTION, SOLUTION INTRAMUSCULAR; INTRAVENOUS
Status: DISCONTINUED | OUTPATIENT
Start: 2018-06-20 | End: 2018-06-20

## 2018-06-20 RX ORDER — SUCCINYLCHOLINE CHLORIDE 20 MG/ML
INJECTION INTRAMUSCULAR; INTRAVENOUS
Status: DISCONTINUED | OUTPATIENT
Start: 2018-06-20 | End: 2018-06-20

## 2018-06-20 RX ORDER — IPRATROPIUM BROMIDE AND ALBUTEROL SULFATE 2.5; .5 MG/3ML; MG/3ML
3 SOLUTION RESPIRATORY (INHALATION) EVERY 6 HOURS PRN
COMMUNITY

## 2018-06-20 RX ORDER — ONDANSETRON 8 MG/1
8 TABLET, ORALLY DISINTEGRATING ORAL EVERY 8 HOURS PRN
Status: DISCONTINUED | OUTPATIENT
Start: 2018-06-20 | End: 2018-06-20 | Stop reason: HOSPADM

## 2018-06-20 RX ADMIN — ROCURONIUM BROMIDE 5 MG: 10 INJECTION, SOLUTION INTRAVENOUS at 12:06

## 2018-06-20 RX ADMIN — SUCCINYLCHOLINE CHLORIDE 140 MG: 20 INJECTION, SOLUTION INTRAMUSCULAR; INTRAVENOUS at 12:06

## 2018-06-20 RX ADMIN — LIDOCAINE HYDROCHLORIDE 0.1 MG: 10 INJECTION, SOLUTION EPIDURAL; INFILTRATION; INTRACAUDAL; PERINEURAL at 10:06

## 2018-06-20 RX ADMIN — FENTANYL CITRATE 50 MCG: 50 INJECTION, SOLUTION INTRAMUSCULAR; INTRAVENOUS at 12:06

## 2018-06-20 RX ADMIN — SODIUM CHLORIDE: 0.9 INJECTION, SOLUTION INTRAVENOUS at 10:06

## 2018-06-20 RX ADMIN — PROPOFOL 130 MG: 10 INJECTION, EMULSION INTRAVENOUS at 12:06

## 2018-06-20 RX ADMIN — GENTAMICIN SULFATE 160 MG: 40 INJECTION, SOLUTION INTRAMUSCULAR; INTRAVENOUS at 12:06

## 2018-06-20 RX ADMIN — ONDANSETRON 4 MG: 2 INJECTION INTRAMUSCULAR; INTRAVENOUS at 12:06

## 2018-06-20 RX ADMIN — AMPICILLIN SODIUM 1 G: 1 INJECTION, POWDER, FOR SOLUTION INTRAMUSCULAR; INTRAVENOUS at 11:06

## 2018-06-20 RX ADMIN — LIDOCAINE HYDROCHLORIDE 100 MG: 20 INJECTION, SOLUTION INTRAVENOUS at 12:06

## 2018-06-20 NOTE — ANESTHESIA POSTPROCEDURE EVALUATION
"Anesthesia Post Evaluation    Patient: Raymond Elizondo    Procedure(s) Performed: Procedure(s) (LRB):  CYSTOSCOPY, WITH DIRECT VISION INTERNAL URETHROTOMY WITH QUANTA LASER (N/A)    Final Anesthesia Type: general  Patient location during evaluation: PACU  Level of consciousness: awake and alert  Post-procedure vital signs: reviewed and stable  Pain management: adequate  Airway patency: patent  PONV status at discharge: No PONV  Anesthetic complications: no      Cardiovascular status: blood pressure returned to baseline  Respiratory status: unassisted and spontaneous ventilation  Hydration status: euvolemic  Follow-up not needed.        Visit Vitals  /68   Pulse (!) 52   Temp 36.7 °C (98.1 °F) (Temporal)   Resp (!) 24   Ht 5' 9" (1.753 m)   Wt 76.1 kg (167 lb 12.3 oz)   SpO2 96%   BMI 24.78 kg/m²       Pain/Bennie Score: Pain Assessment Performed: Yes (6/20/2018  1:48 PM)  Presence of Pain: denies (6/20/2018  1:48 PM)  Pain Rating Prior to Med Admin: 0 (6/20/2018  1:48 PM)  Pain Rating Post Med Admin: 0 (6/20/2018  1:48 PM)  Bennie Score: 10 (6/20/2018  1:48 PM)      "

## 2018-06-20 NOTE — DISCHARGE INSTRUCTIONS
Continue course of previously prescribed antibiotics.  Leyva may be removed by hospice nurse on Monday, 6/25.   Begin self dilations following catheter removal once a week with a 16 Fr leyva.

## 2018-06-20 NOTE — H&P
"Urology ([facility]) H&P  Staff: Connor Hemphill MD    Is this patient in a research study?  No    CC: urethral stricture    HPI:  Raymond Elizondo is a 78 y.o. male with a hx of a bulbar urethral stricture. He has undergone several DVIU in the past. Recently he began feeling as though he was unable to empty his bladder and was unable to pass a catheter. He has been on abx for a recent UTI. He is otherwise without complaints today.      ROS:  Neg except per HPI    Past Medical History:   Diagnosis Date    Cirrhosis     COPD (chronic obstructive pulmonary disease)     Coronary artery disease     Diabetes mellitus     Esophageal cancer     Hypertension     Irregular heart beat     Pancreatitis     3x    Prostate cancer     Skin cancer     multiple sites    Urinary tract infection        Past Surgical History:   Procedure Laterality Date    APPENDECTOMY      CHOLECYSTECTOMY  2001    ELBOW FRACTURE SURGERY Right          ESOPHAGEAL RECONSTRUCTION  2011    KNEE ARTHROSCOPY W/ MENISCAL REPAIR Right          PROSTATE BIOPSY  2016    SKIN CANCER EXCISION      Left ear, Left flank, Right flank    TONSILLECTOMY         Social History     Social History    Marital status:      Spouse name: N/A    Number of children: N/A    Years of education: N/A     Social History Main Topics    Smoking status: Former Smoker     Packs/day: 0.25     Years: 0.50    Smokeless tobacco: Never Used    Alcohol use No    Drug use: No    Sexual activity: Not Currently     Other Topics Concern    None     Social History Narrative    None       Family History   Problem Relation Age of Onset    Diabetes Mother     Cancer Mother     Cancer Father     Prostate cancer Neg Hx     Kidney disease Neg Hx        Review of patient's allergies indicates:   Allergen Reactions    Aspirin Other (See Comments)     Any blood thinners cause Severe bleeding, "I'm a bleeder"      Ciprofloxacin Swelling     6/20/18 patient is " currently taking cipro without problems       No current facility-administered medications on file prior to encounter.      Current Outpatient Prescriptions on File Prior to Encounter   Medication Sig Dispense Refill    albuterol (PROVENTIL) 5 mg/mL nebulizer solution Take 2.5 mg by nebulization every 6 (six) hours as needed.      alprazolam (XANAX) 0.25 MG tablet Take 0.25 mg by mouth 3 (three) times daily as needed for Insomnia or Anxiety. Takes every morning and prn      amlodipine (NORVASC) 10 MG tablet Take 10 mg by mouth every morning.       atenolol (TENORMIN) 25 MG tablet Take 25 mg by mouth every morning.       BREO ELLIPTA 100-25 mcg/dose diskus inhaler Inhale 1 puff into the lungs every morning.       bumetanide (BUMEX) 1 MG tablet Take 1 mg by mouth every morning.      ciprofloxacin HCl (CIPRO) 500 MG tablet Take 1 tablet (500 mg total) by mouth 2 (two) times daily. 20 tablet 0    glipiZIDE (GLUCOTROL) 5 MG tablet Take 2.5 mg by mouth daily with breakfast. Takes half of a 5 mg tablet every morning      omeprazole (PRILOSEC) 20 MG capsule Take 20 mg by mouth every morning.       cetirizine (ZYRTEC) 1 mg/mL syrup Take 10 mLs (10 mg total) by mouth once daily. 60 mL 0    ciprofloxacin HCl (CIPRO) 250 MG tablet Take 1 tablet (250 mg total) by mouth 2 (two) times daily. 20 tablet 0    diazePAM (VALIUM) 5 MG tablet take 1 TABLET BY MOUTH EVERY 6 hours as needed anxiety and restlessness  0    diphenoxylate-atropine 2.5-0.025 mg (LOMOTIL) 2.5-0.025 mg per tablet Take 2 tablets by mouth every 6 (six) hours as needed.  5    doxycycline (VIBRAMYCIN) 100 MG Cap       econazole nitrate 1 % cream APPLY TO THE NAILS DAILY. FILE NAILS ONCE A WEEK.  10    fenofibrate 160 MG Tab Take 160 mg by mouth every morning.       hydrocodone-acetaminophen 5-325mg (NORCO) 5-325 mg per tablet Take 1 tablet by mouth every 4 (four) hours as needed for Pain. 21 tablet 0    hyoscyamine (LEVSIN/SL) 0.125 mg Subl take 1  TABLET BY MOUTH 6 hours as needed EXCESSIVE SECRETIONS  0    lorazepam (ATIVAN) 1 MG tablet Take 1 mg by mouth every 6 (six) hours as needed for Anxiety. Takes two 1 mg tablets every morning and takes 1 mg every bedtime      nitroGLYCERIN (NITROSTAT) 0.4 MG SL tablet Place 0.4 mg under the tongue every 5 (five) minutes as needed for Chest pain.      polyethylene glycol (GLYCOLAX) 17 gram PwPk Take 17 g by mouth once daily. 30 packet 0    potassium chloride (MICRO-K) 10 MEQ CpSR Take 10 mEq by mouth every morning.       potassium chloride SA (K-DUR,KLOR-CON) 10 MEQ tablet Take 10 mEq by mouth 2 (two) times daily.  1    pravastatin (PRAVACHOL) 40 MG tablet Take 40 mg by mouth every morning.       tamsulosin (FLOMAX) 0.4 mg Cp24 Take 1 capsule (0.4 mg total) by mouth every evening. 90 capsule 3    traMADol (ULTRAM) 50 mg tablet Take 50 mg by mouth 4 (four) times daily.  5    trazodone (DESYREL) 150 MG tablet Take 150 mg by mouth every evening.  5    VENTOLIN HFA 90 mcg/actuation inhaler Inhale 2 puffs into the lungs every 6 (six) hours as needed.          Anticoagulation:  No    Physical Exam:  weight 167 lb/76 kg    AAOx4, NAD, WDWN  NC/AT, EOMI, PER, sclerae anicteric, speech normal, tongue midline  Nl effort, CTAB  RRR  Soft, non-tender, non-distended    Labs:    Urine dipstick today - negative for blood, leuks, nitrite     Lab Results   Component Value Date    WBC 5.44 06/08/2017    HGB 13.2 (L) 06/08/2017    HCT 38.3 (L) 06/08/2017    MCV 88 06/08/2017     06/08/2017       BMP  Lab Results   Component Value Date     06/13/2018    K 4.2 06/13/2018    CL 99 06/13/2018    CO2 29 06/13/2018    BUN 21 06/13/2018    CREATININE 0.8 06/13/2018    CALCIUM 10.3 06/13/2018    ANIONGAP 8 06/13/2018    ESTGFRAFRICA >60.0 06/13/2018    EGFRNONAA >60.0 06/13/2018       Assessment: Raymond Elizondo is a 78 y.o. male with recurrent urethral stricture    Plan:     1. To OR today for cysto, DVIU  2.  Consents signed   3. I have explained the risk, benefits, and alternatives of the procedure in detail. The patient voices understanding and all questions have been answered. The patient agrees to proceed as planned.       Mare Rolon MD

## 2018-06-20 NOTE — TRANSFER OF CARE
"Anesthesia Transfer of Care Note    Patient: Raymond Elizondo    Procedure(s) Performed: Procedure(s) (LRB):  CYSTOSCOPY, WITH DIRECT VISION INTERNAL URETHROTOMY WITH QUANTA LASER (N/A)    Patient location: PACU    Anesthesia Type: general    Transport from OR: Transported from OR on 2-3 L/min O2 by NC with adequate spontaneous ventilation    Post pain: adequate analgesia    Post assessment: no apparent anesthetic complications    Post vital signs: stable    Level of consciousness: awake, oriented and alert    Nausea/Vomiting: no nausea/vomiting    Complications: none    Transfer of care protocol was followed      Last vitals:   Visit Vitals  Ht 5' 9" (1.753 m)   Wt 76.1 kg (167 lb 12.3 oz)   BMI 24.78 kg/m²     "

## 2018-06-20 NOTE — DISCHARGE SUMMARY
[facility]  Discharge Note  Short Stay    Admit Date: 6/20/2018    Discharge Date and Time: 06/20/2018 12:42 PM      Attending Physician: Connor Hemphill MD     Discharge Provider: Mare Rolon    Diagnoses:  Active Hospital Problems    Diagnosis  POA    Urethral stricture [N35.9]  Yes    History of brachytherapy [Z92.3]  Not Applicable    Neurogenic bladder [N31.9]  Yes    H/O prostate cancer [Z85.46]  Not Applicable    Diabetic polyneuropathy associated with type 2 diabetes mellitus [E11.42]  Yes    Condyloma acuminata [A63.0]  Yes    Gross hematuria [R31.0]  Yes      Resolved Hospital Problems    Diagnosis Date Resolved POA   No resolved problems to display.       Discharged Condition: good    Hospital Course: Patient was admitted for cysto, DVIU and tolerated the procedure well with no complications. The patient was discharged home in good condition on the same day.       Final Diagnoses: Same as principal problem.    Disposition: Home or Self Care    Follow up/Patient Instructions:    Medications:  Reconciled Home Medications: Current Discharge Medication List      CONTINUE these medications which have NOT CHANGED    Details   albuterol (PROVENTIL) 5 mg/mL nebulizer solution Take 2.5 mg by nebulization every 6 (six) hours as needed.      albuterol-ipratropium (DUO-NEB) 2.5 mg-0.5 mg/3 mL nebulizer solution Take 3 mLs by nebulization every 6 (six) hours as needed for Wheezing. Rescue      alprazolam (XANAX) 0.25 MG tablet Take 0.25 mg by mouth 3 (three) times daily as needed for Insomnia or Anxiety. Takes every morning and prn      amlodipine (NORVASC) 10 MG tablet Take 10 mg by mouth every morning.       atenolol (TENORMIN) 25 MG tablet Take 25 mg by mouth every morning.       BREO ELLIPTA 100-25 mcg/dose diskus inhaler Inhale 1 puff into the lungs every morning.       bumetanide (BUMEX) 1 MG tablet Take 1 mg by mouth every morning.      !! ciprofloxacin HCl (CIPRO) 500 MG tablet Take 1  tablet (500 mg total) by mouth 2 (two) times daily.  Qty: 20 tablet, Refills: 0      glipiZIDE (GLUCOTROL) 5 MG tablet Take 2.5 mg by mouth daily with breakfast. Takes half of a 5 mg tablet every morning      morphine 100 mg/5 mL (20 mg/mL) concentrated solution Take 10 mg by mouth every 4 (four) hours as needed for Pain.      omeprazole (PRILOSEC) 20 MG capsule Take 20 mg by mouth every morning.       cetirizine (ZYRTEC) 1 mg/mL syrup Take 10 mLs (10 mg total) by mouth once daily.  Qty: 60 mL, Refills: 0    Associated Diagnoses: Acute cystitis with hematuria; Allergic state, initial encounter      !! ciprofloxacin HCl (CIPRO) 250 MG tablet Take 1 tablet (250 mg total) by mouth 2 (two) times daily.  Qty: 20 tablet, Refills: 0    Associated Diagnoses: Acute cystitis with hematuria      diazePAM (VALIUM) 5 MG tablet take 1 TABLET BY MOUTH EVERY 6 hours as needed anxiety and restlessness  Refills: 0      diphenoxylate-atropine 2.5-0.025 mg (LOMOTIL) 2.5-0.025 mg per tablet Take 2 tablets by mouth every 6 (six) hours as needed.  Refills: 5      doxycycline (VIBRAMYCIN) 100 MG Cap       econazole nitrate 1 % cream APPLY TO THE NAILS DAILY. FILE NAILS ONCE A WEEK.  Refills: 10      fenofibrate 160 MG Tab Take 160 mg by mouth every morning.       hydrocodone-acetaminophen 5-325mg (NORCO) 5-325 mg per tablet Take 1 tablet by mouth every 4 (four) hours as needed for Pain.  Qty: 21 tablet, Refills: 0      hyoscyamine (LEVSIN/SL) 0.125 mg Subl take 1 TABLET BY MOUTH 6 hours as needed EXCESSIVE SECRETIONS  Refills: 0      lorazepam (ATIVAN) 1 MG tablet Take 1 mg by mouth every 6 (six) hours as needed for Anxiety. Takes two 1 mg tablets every morning and takes 1 mg every bedtime      nitroGLYCERIN (NITROSTAT) 0.4 MG SL tablet Place 0.4 mg under the tongue every 5 (five) minutes as needed for Chest pain.      polyethylene glycol (GLYCOLAX) 17 gram PwPk Take 17 g by mouth once daily.  Qty: 30 packet, Refills: 0      potassium  chloride (MICRO-K) 10 MEQ CpSR Take 10 mEq by mouth every morning.       potassium chloride SA (K-DUR,KLOR-CON) 10 MEQ tablet Take 10 mEq by mouth 2 (two) times daily.  Refills: 1      pravastatin (PRAVACHOL) 40 MG tablet Take 40 mg by mouth every morning.       tamsulosin (FLOMAX) 0.4 mg Cp24 Take 1 capsule (0.4 mg total) by mouth every evening.  Qty: 90 capsule, Refills: 3    Associated Diagnoses: Weak urine stream      traMADol (ULTRAM) 50 mg tablet Take 50 mg by mouth 4 (four) times daily.  Refills: 5      trazodone (DESYREL) 150 MG tablet Take 150 mg by mouth every evening.  Refills: 5      VENTOLIN HFA 90 mcg/actuation inhaler Inhale 2 puffs into the lungs every 6 (six) hours as needed.        !! - Potential duplicate medications found. Please discuss with provider.          Discharge Procedure Orders  Diet general     Activity as tolerated     Call MD for:  temperature >100.4     Call MD for:  persistent nausea and vomiting     Call MD for:  severe uncontrolled pain     No dressing needed       Follow-up Information     Connor Hemphill MD In 3 months.    Specialty:  Urology  Why:  symptom check  Contact information:  [id number]  [facility] [address]  [phone]                     Mare Rolon MD  Urology, PGY-3  Pager# [phone]

## 2018-06-20 NOTE — PROGRESS NOTES
Patient is a poor historian about medications. unable to say what his meds are and when he last took medications. No family member here to assist.Patient states he is on hospice care and has been taking morphine for his pain. Talking to anes about pain med.

## 2018-06-20 NOTE — OP NOTE
[facility]  Operative Note    Date: 06/20/2018    Pre-Op Diagnosis:   1. Recurrent bulbar urethral stricture  2. Prostate cancer   3. Hx of brachytherapy    Patient Active Problem List   Diagnosis    Acute urinary retention    Gross hematuria    Membranous urethral stricture    H/O prostate cancer    Diabetic polyneuropathy associated with type 2 diabetes mellitus    Condyloma acuminata    Neurogenic bladder    History of brachytherapy    Urethral stricture    Olecranon bursitis, left elbow     Post-Op Diagnosis: same    Procedure(s) Performed:   1.  Cystoscopy with DVIU  2.  Fluoro < 1 hour    Specimen(s): none    Staff Surgeon: Connor Hemphill MD    Assistant Surgeon: Mare Rolon MD    Anesthesia: General endotracheal anesthesia    Indications: Raymond Elizondo is a 78 y.o. male with a hx of prostate cancer treated with brachytherapy, now with a recurrent urethral stricture presenting for endoscopic management.      Findings: tight stricture in bulbar urethra extending into prostate    Estimated Blood Loss: min    Drains: 22 Fr leyva catheter    Procedure in Detail:  After risks, benefits and possible complications of cystoscopy were explained, the patient elected to undergo the procedure and informed consent was obtained. All questions were answered in the brigido-operative area. The patient was transferred to the cystoscopy suite and placed in the supine position.  SCDs were applied and working.  Anesthesia was administered.  Once the patient was adequately sedated, he was placed in the dorsal lithotomy position and prepped and draped in the usual sterile fashion.  Time out was performed, brigido-procedural antibiotics were confirmed.     A 22 Arabic revolix laser scope was introduced into the bladder per urethra. A stricture was seen at the bulbar urethra. A glide wire was passed through the stricture into the bladder, position confirmed with fluoro.  The stricture was then incised  with a 1000 micro Quanta laser fiber set at 30 gaviria at the 12 o'clock position. The stricture was noted to be very dense and extend into the prostatic fossa.     The scope was then passed through the rest of the urethra and into the bladder. The scope was removed keeping the wire in place. A 22 Fr [place] tip leyva was passed over the wire into the bladder and the wire was removed. The bladder was drained, and the patient was removed from lithotomy.     The patient tolerated the procedure well and was transferred to recovery in stable condition.    Disposition:  The patient will follow up with Dr. Hemphill in 3 months.  He will finish his previously prescribed antibiotics course. He will have his leyva removed on Monday by his hospice nurse.     Mare Rolon MD

## 2018-06-21 ENCOUNTER — TELEPHONE (OUTPATIENT)
Dept: UROLOGY | Facility: CLINIC | Age: 78
End: 2018-06-21

## 2018-06-21 NOTE — TELEPHONE ENCOUNTER
Called to discuss questions. He wanted to know about cic after leyva removed. I answered all questions. I offered him an appt to discuss questions and remove leyva. Explained that hospice nurse could remove if he wanted too. He wanted an appt Monday. Explained appt at 11 at [facility] and he verbalized understanding.     ----- Message from Maylin Lyons LPN sent at 6/21/2018  2:02 PM CDT -----  Contact: Pt:[phone]      ----- Message -----  From: Henny Constantino  Sent: 6/21/2018   1:55 PM  To: Rosario MCDONALD Staff    .Needs Advice    Reason for call:  Pt called and states he had some questions in regards to his procedure with  and pt states he would like to speak with Ibeth TAO.    Communication Preference:  Additional Information:  ----- Message -----  From: Henny Constantino  Sent: 6/21/2018  12:04 PM  To: Rosario MCDONALD Staff    .Needs Advice    Reason for call: Pt mother called and states she missed a call from the nurse in regards to the pt.    Communication Preference:Pt mother work:[phone]  Additional Information:

## 2018-06-25 ENCOUNTER — OFFICE VISIT (OUTPATIENT)
Dept: UROLOGY | Facility: CLINIC | Age: 78
End: 2018-06-25
Payer: MEDICARE

## 2018-06-25 VITALS
HEIGHT: 69 IN | DIASTOLIC BLOOD PRESSURE: 55 MMHG | WEIGHT: 167.75 LBS | HEART RATE: 53 BPM | SYSTOLIC BLOOD PRESSURE: 111 MMHG | BODY MASS INDEX: 24.85 KG/M2

## 2018-06-25 DIAGNOSIS — Z98.890 POST-OPERATIVE STATE: Primary | ICD-10-CM

## 2018-06-25 DIAGNOSIS — N35.9 URETHRAL STRICTURE, UNSPECIFIED STRICTURE TYPE: ICD-10-CM

## 2018-06-25 PROCEDURE — 99999 PR PBB SHADOW E&M-EST. PATIENT-LVL III: CPT | Mod: PBBFAC,,, | Performed by: NURSE PRACTITIONER

## 2018-06-25 PROCEDURE — 99024 POSTOP FOLLOW-UP VISIT: CPT | Mod: S$GLB,,, | Performed by: NURSE PRACTITIONER

## 2018-06-25 NOTE — PROGRESS NOTES
CHIEF COMPLAINT:    Mr. Elizondo is a 78 y.o. male presenting for post op cath removal.   PRESENTING ILLNESS:    Raymond Elizondo is a 78 y.o. male who presents for post op cath removal.    He is accompanied by his daughter.  He was last seen with me on 1/4/18.     Procedure(s) Performed with Dr. Hemphill:   1.  Cystoscopy with DVIU  2.  Fluoro < 1 hour    Indications: Raymond Elizondo is a 78 y.o. male with a hx of prostate cancer treated with brachytherapy, now with a recurrent urethral stricture presenting for endoscopic management.       Findings: tight stricture in bulbar urethra extending into prostate    Prior to procedures, he had c/o difficulty catheterizing with blood clots, hesitancy, difficult voiding, and dribbling. He reports no complaints with the catheter today.     s/p brachytherapy combined with external beam radiation therapy for prostate cancer at  10 years ago.  Hx of urinary retention with blood clots.    Patient is currently on Hospice care d/t cancer.    REVIEW OF SYSTEMS:    Constitutional: Negative for fever and chills.   HENT: Negative for hearing loss.   Eyes: Negative for visual disturbance.   Respiratory: Negative for shortness of breath.   Cardiovascular: Negative for chest pain.   Gastrointestinal: Negative for nausea, vomiting, and constipation.   Genitourinary:  See HPI  Neurological: Negative for dizziness.   Hematological: Does not bruise/bleed easily.   Psychiatric/Behavioral: Negative for confusion.       PATIENT HISTORY:    Past Medical History:   Diagnosis Date    Cirrhosis     COPD (chronic obstructive pulmonary disease)     Coronary artery disease     Diabetes mellitus     Esophageal cancer     Hypertension     Irregular heart beat     Pancreatitis     3x    Prostate cancer     Skin cancer     multiple sites    Urinary tract infection        Past Surgical History:   Procedure Laterality Date    APPENDECTOMY      CHOLECYSTECTOMY  2001    CYSTOURETHROSCOPY  WITH DIRECT VISION INTERNAL URETHROTOMY N/A 6/20/2018    Procedure: CYSTOSCOPY, WITH DIRECT VISION INTERNAL URETHROTOMY WITH QUANTA LASER;  Surgeon: Connor Hemphill MD;  Location: [facility] OR [address] [place] [address];  Service: Urology;  Laterality: N/A;  QUANTA LASER    ELBOW FRACTURE SURGERY Right          ESOPHAGEAL RECONSTRUCTION  2011    KNEE ARTHROSCOPY W/ MENISCAL REPAIR Right          PROSTATE BIOPSY  2016    SKIN CANCER EXCISION      Left ear, Left flank, Right flank    TONSILLECTOMY         Family History   Problem Relation Age of Onset    Diabetes Mother     Cancer Mother     Cancer Father     Prostate cancer Neg Hx     Kidney disease Neg Hx        Social History     Social History    Marital status:      Spouse name: N/A    Number of children: N/A    Years of education: N/A     Occupational History    Not on file.     Social History Main Topics    Smoking status: Former Smoker     Packs/day: 0.25     Years: 0.50    Smokeless tobacco: Never Used    Alcohol use No    Drug use: No    Sexual activity: Not Currently     Other Topics Concern    Not on file     Social History Narrative    No narrative on file       Allergies:  Aspirin and Ciprofloxacin    Medications:    Current Outpatient Prescriptions:     albuterol (PROVENTIL) 5 mg/mL nebulizer solution, Take 2.5 mg by nebulization every 6 (six) hours as needed., Disp: , Rfl:     albuterol-ipratropium (DUO-NEB) 2.5 mg-0.5 mg/3 mL nebulizer solution, Take 3 mLs by nebulization every 6 (six) hours as needed for Wheezing. Rescue, Disp: , Rfl:     alprazolam (XANAX) 0.25 MG tablet, Take 0.25 mg by mouth 3 (three) times daily as needed for Insomnia or Anxiety. Takes every morning and prn, Disp: , Rfl:     amlodipine (NORVASC) 10 MG tablet, Take 10 mg by mouth every morning. , Disp: , Rfl:     atenolol (TENORMIN) 25 MG tablet, Take 25 mg by mouth every morning. , Disp: , Rfl:     BREO ELLIPTA 100-25 mcg/dose diskus inhaler, Inhale 1 puff into the  lungs every morning. , Disp: , Rfl:     bumetanide (BUMEX) 1 MG tablet, Take 1 mg by mouth every morning., Disp: , Rfl:     diazePAM (VALIUM) 5 MG tablet, take 1 TABLET BY MOUTH EVERY 6 hours as needed anxiety and restlessness, Disp: , Rfl: 0    diphenoxylate-atropine 2.5-0.025 mg (LOMOTIL) 2.5-0.025 mg per tablet, Take 2 tablets by mouth every 6 (six) hours as needed., Disp: , Rfl: 5    doxycycline (VIBRAMYCIN) 100 MG Cap, , Disp: , Rfl:     econazole nitrate 1 % cream, APPLY TO THE NAILS DAILY. FILE NAILS ONCE A WEEK., Disp: , Rfl: 10    fenofibrate 160 MG Tab, Take 160 mg by mouth every morning. , Disp: , Rfl:     glipiZIDE (GLUCOTROL) 5 MG tablet, Take 2.5 mg by mouth daily with breakfast. Takes half of a 5 mg tablet every morning, Disp: , Rfl:     hydrocodone-acetaminophen 5-325mg (NORCO) 5-325 mg per tablet, Take 1 tablet by mouth every 4 (four) hours as needed for Pain., Disp: 21 tablet, Rfl: 0    hyoscyamine (LEVSIN/SL) 0.125 mg Subl, take 1 TABLET BY MOUTH 6 hours as needed EXCESSIVE SECRETIONS, Disp: , Rfl: 0    lorazepam (ATIVAN) 1 MG tablet, Take 1 mg by mouth every 6 (six) hours as needed for Anxiety. Takes two 1 mg tablets every morning and takes 1 mg every bedtime, Disp: , Rfl:     morphine 100 mg/5 mL (20 mg/mL) concentrated solution, Take 10 mg by mouth every 4 (four) hours as needed for Pain., Disp: , Rfl:     nitroGLYCERIN (NITROSTAT) 0.4 MG SL tablet, Place 0.4 mg under the tongue every 5 (five) minutes as needed for Chest pain., Disp: , Rfl:     omeprazole (PRILOSEC) 20 MG capsule, Take 20 mg by mouth every morning. , Disp: , Rfl:     polyethylene glycol (GLYCOLAX) 17 gram PwPk, Take 17 g by mouth once daily., Disp: 30 packet, Rfl: 0    potassium chloride (MICRO-K) 10 MEQ CpSR, Take 10 mEq by mouth every morning. , Disp: , Rfl:     potassium chloride SA (K-DUR,KLOR-CON) 10 MEQ tablet, Take 10 mEq by mouth 2 (two) times daily., Disp: , Rfl: 1    pravastatin (PRAVACHOL) 40 MG  tablet, Take 40 mg by mouth every morning. , Disp: , Rfl:     tamsulosin (FLOMAX) 0.4 mg Cp24, Take 1 capsule (0.4 mg total) by mouth every evening., Disp: 90 capsule, Rfl: 3    traMADol (ULTRAM) 50 mg tablet, Take 50 mg by mouth 4 (four) times daily., Disp: , Rfl: 5    trazodone (DESYREL) 150 MG tablet, Take 150 mg by mouth every evening., Disp: , Rfl: 5    VENTOLIN HFA 90 mcg/actuation inhaler, Inhale 2 puffs into the lungs every 6 (six) hours as needed. , Disp: , Rfl:     cetirizine (ZYRTEC) 1 mg/mL syrup, Take 10 mLs (10 mg total) by mouth once daily., Disp: 60 mL, Rfl: 0    PHYSICAL EXAMINATION:    Constitutional: He appears well-developed and well-nourished.  He is in no apparent distress.    Eyes: No scleral icterus noted bilaterally.  No discharge noted bilaterally.      Cardiovascular: Normal rate.  No pitting edema noted in lower extremities bilaterally    Pulmonary/Chest: Effort normal. No respiratory distress.     Abdominal:  He exhibits no distension.      Neurological: He is alert and oriented to person, place, and time.     Skin: Skin is warm and dry.     Psych: Cooperative with normal affect.    Mouth: dentition is normal.       Genitourinary: The penis is circumcised. The urethral meatus is normal. Scrotum is normal size and contour.      Physical Exam      LABS:    Lab Results   Component Value Date    PSADIAG <0.01 06/27/2016       IMPRESSION:    Encounter Diagnoses   Name Primary?    Post-operative state Yes    Urethral stricture, unspecified stricture type          PLAN:  -Reassured patient and daughter,   -Multiple sample 14 & 16 fr catheters handed to the pt,  -Patient counseled on the possibility of the stricture redeveloping.   He was counseled on the symptoms to look for and instructed to follow up if he starts developing such symptoms. Continue CIC weekly or notify me if wanting indwelling cath.   -Spoke with hospice nurse, Hilda, at [phone]. She states if cath needs to be  reinserted he would have to make an appt with me.   -Follow up as needed.

## 2019-11-15 NOTE — PRE ADMISSION SCREENING
"Anesthesia Assessment: Preoperative EQUATION    Planned Procedure: Procedure(s) (LRB):  CYSTOSCOPY (N/A)  CYSTOSCOPY, WITH DIRECT VISION INTERNAL URETHROTOMY WITH QUANTA LASER (N/A)  DILATION, URETHRA (N/A)  Requested Anesthesia Type:General  Surgeon: Connor Hemphill MD  Service: Urology  Known or anticipated Date of Surgery:6/20/2018    Surgeon notes: reviewed-this surgery was rescheduled from 6/13 to 6/20 due to a UTI, being treated with abx by surgeon.    Electronic QUestionnaire Assessment completed via nurse interview with patient.        NO AQ    Triage considerations:     The patient has no apparent active cardiac condition (No unstable coronary Syndrome such as severe unstable angina or recent [<1 month] myocardial infarction, decompensated CHF, severe valvular   disease or significant arrhythmia)    Previous anesthesia records:GETA, MAC and No problems 6/2017 bursectomy Airway/Jaw/Neck:  Airway Findings: Mouth Opening: Normal Tongue: Normal  Mallampati: II  TM Distance: Normal, at least 6 cm  Jaw/Neck Findings:  Neck ROM: Normal ROM  Neck Findings: Normal       Last PCP note: 3-6 months ago , outside [facility]   Subspecialty notes: pt in Hospice care for esophageal cancer     Other important co-morbidities: COPD/smoker, NADEEN, DM2, HTN, CAD (S/P "mild" MI), GERD, esophageal cancer     Tests already available:  Available tests,  within 1 month . 6/13/18 BMP. 6/2017 EKG, CBC, BMP. 2016 A1c.            Instructions given. (See in Nurse's note)    Optimization:  Anesthesia Preop Clinic Assessment  Indicated-case discussed with Dr Bautista-no further orders noted, will not require clearance.          Plan:    Testing:  none     Patient  has previously scheduled Medical Appointment: none    Navigation:                  Straight Line to surgery.               No tests, anesthesia preop clinic visit, or consult required.                  "
patient
